# Patient Record
Sex: MALE | Race: WHITE | Employment: OTHER | URBAN - METROPOLITAN AREA
[De-identification: names, ages, dates, MRNs, and addresses within clinical notes are randomized per-mention and may not be internally consistent; named-entity substitution may affect disease eponyms.]

---

## 2017-06-27 ENCOUNTER — OFFICE VISIT (OUTPATIENT)
Dept: FAMILY MEDICINE CLINIC | Age: 57
End: 2017-06-27

## 2017-06-27 VITALS
BODY MASS INDEX: 29.13 KG/M2 | SYSTOLIC BLOOD PRESSURE: 118 MMHG | HEART RATE: 71 BPM | DIASTOLIC BLOOD PRESSURE: 83 MMHG | TEMPERATURE: 96.8 F | WEIGHT: 227 LBS | OXYGEN SATURATION: 98 % | HEIGHT: 74 IN | RESPIRATION RATE: 18 BRPM

## 2017-06-27 DIAGNOSIS — F43.0 ACUTE STRESS DISORDER: Primary | ICD-10-CM

## 2017-06-27 RX ORDER — SERTRALINE HYDROCHLORIDE 25 MG/1
25 TABLET, FILM COATED ORAL DAILY
Qty: 90 TAB | Refills: 0 | Status: SHIPPED | OUTPATIENT
Start: 2017-06-27 | End: 2017-09-28 | Stop reason: SDUPTHER

## 2017-06-27 NOTE — LETTER
NOTIFICATION RETURN TO WORK  
 
6/27/2017 4:19 PM 
 
Mr. [facility] [address] [facility] [place] [id number] [place] [id number] To Whom It May Concern: 
 
Debbie Mendez is currently under the care of [address] [place] [address]. He will return to work on: 07/05/2017 If there are questions or concerns please have the patient contact our office. Sincerely, Mitesh Smalls MD

## 2017-06-27 NOTE — PATIENT INSTRUCTIONS
Work-Life Balance: Care Instructions  Your Care Instructions  Do you ever feel like there is not enough time to do all of the things you have to do, and no time at all for the things you enjoy? If so, you are not alone. On average, people in the [place] have worked more and more hours each year since 1970. But in recent years, fewer people say they want to take on more at work, even if they would get promoted or get paid more money. More and more workers say they want time to spend with their families and to do things that are important to them. Do you ever feel:  · That you always have more and more work to do at your job? · That too many people depend on you every day? · That you never have enough time for your family or friends? · That you never have time for hobbies or things you enjoy? · That each second of your day is scheduled? If you answered \"yes\" to any of these questions, take steps at work and at home to get your life into balance. Follow-up care is a key part of your treatment and safety. Be sure to make and go to all appointments, and call your doctor if you are having problems. How can you care for yourself at home? Manage your time  · Focus on the important things. Taking on too much can wear you out. Look at how you spend your time, and redirect your focus. Learn to say \"no\" and let go of things that do not matter. · Set one small goal at a time. Use a day planner. Break large projects into smaller ones. · Ask for help. Let your children, your spouse, your coworkers, and other people in your life help you get things done. · Leave your job at the office. If you give up free time to get more work done, you may pay for it with stress. If your job offers a flexible work schedule, use it to fit your own work style. For instance, come in earlier to have a longer lunch break, or make time for a yoga class or workout during your workday. · Unplug.  Do not let technology (such as your cell phone or the Internet) erase the line between your time and your employer's time. Lower job stress  Job stress causes trouble at work and at home. At work, you may worry about things you have not had time to do at home. At home, you may worry about your job. This cycle upsets your work-life balance. Lowering your job stress can get your life back in balance. Job stress can be caused by:  · Pressure and deadlines. · Heavy workloads or long hours. · Not being allowed to make decisions. · Health and safety hazards. · Feeling you may lose your job. · Unclear or changing job duties. · Too much responsibility. · Work that is very tiring or boring. Do any of these things bother you? Consider talking with your boss to change things. There are some things that you may not be able to control. But even a few small changes might help lower your stress. Take advantage of programs at work  Businesses make money and are better off in other ways if their employees are healthy and happy. For this reason, many companies have programs to help balance work life and home life. These programs may include:  · Flexible schedules and hours. · Time off for family reasons, education, or community service. · Being able to work from home. · Employee assistance programs to provide counseling. · Child-care programs. Check to see if your company has any of these or other programs that could help you. If not, consider talking to your boss about why work-life balance programs make good business sense. Even if your company does not start an official program, you may be able to get flexible hours, time off, or the ability to do some work from home. Know when to quit  If you are truly unhappy because of a stressful job, and if the suggestions here have not worked, it may be time to think about changing jobs or changing careers. But before you quit, take time to research your options. Where can you learn more?   Go to http://silvina-parth.info/. Enter E490 in the search box to learn more about \"Work-Life Balance: Care Instructions. \"  Current as of: July 26, 2016  Content Version: 11.3  © 9540-2608 [employer]. Care instructions adapted under license by [employer] (which disclaims liability or warranty for this information). If you have questions about a medical condition or this instruction, always ask your healthcare professional. [address] any warranty or liability for your use of this information. Learning About Stress  What is stress? Stress is what you feel when you have to handle more than you are used to. Stress is a fact of life for most people, and it affects everyone differently. What causes stress for you may not be stressful for someone else. A lot of things can cause stress. You may feel stress when you go on a job interview, take a test, or run a race. This kind of short-term stress is normal and even useful. It can help you if you need to work hard or react quickly. For example, stress can help you finish an important job on time. Stress also can last a long time. Long-term stress is caused by stressful situations or events. Examples of long-term stress include long-term health problems, ongoing problems at work, or conflicts in your family. Long-term stress can harm your health. How does stress affect your health? When you are stressed, your body responds as though you are in danger. It makes hormones that speed up your heart, make you breathe faster, and give you a burst of energy. This is called the fight-or-flight stress response. If the stress is over quickly, your body goes back to normal and no harm is done. But if stress happens too often or lasts too long, it can have bad effects. Long-term stress can make you more likely to get sick, and it can make symptoms of some diseases worse.  If you tense up when you are stressed, you may develop neck, shoulder, or low back pain. Stress is linked to high blood pressure and heart disease. Stress also harms your emotional health. It can make you woodard, tense, or depressed. Your relationships may suffer, and you may not do well at work or school. What can you do to manage stress? How to relax your mind  · Write. It may help to write about things that are bothering you. This helps you find out how much stress you feel and what is causing it. When you know this, you can find better ways to cope. · Let your feelings out. Talk, laugh, cry, and express anger when you need to. Talking with friends, family, a counselor, or a member of the clergy about your feelings is a healthy way to relieve stress. · Do something you enjoy. For example, listen to music or go to a movie. Practice your hobby or do volunteer work. · Meditate. This can help you relax, because you are not worrying about what happened before or what may happen in the future. · Do guided imagery. Imagine yourself in any setting that helps you feel calm. You can use audiotapes, books, or a teacher to guide you. How to relax your body  · Do something active. Exercise or activity can help reduce stress. Walking is a great way to get started. Even everyday activities such as housecleaning or yard work can help. · Do breathing exercises. For example:  ¨ From a standing position, bend forward from the waist with your knees slightly bent. Let your arms dangle close to the floor. ¨ Breathe in slowly and deeply as you return to a standing position. Roll up slowly and lift your head last.  ¨ Hold your breath for just a few seconds in the standing position. ¨ Breathe out slowly and bend forward from the waist.  · Try yoga or karlene chi. These techniques combine exercise and meditation. You may need some training at first to learn them. What can you do to prevent stress? · Manage your time.  This helps you find time to do the things you want and need to do. · Get enough sleep. Your body recovers from the stresses of the day while you are sleeping. · Get support. Your family, friends, and community can make a difference in how you experience stress. Where can you learn more? Go to http://silvina-parth.info/. Enter X506 in the search box to learn more about \"Learning About Stress. \"  Current as of: July 26, 2016  Content Version: 11.3  © 4291-3318 [employer]. Care instructions adapted under license by [employer] (which disclaims liability or warranty for this information). If you have questions about a medical condition or this instruction, always ask your healthcare professional. [address] any warranty or liability for your use of this information.

## 2017-06-27 NOTE — MR AVS SNAPSHOT
Visit Information Date & Time Provider Department Dept. Phone Encounter #  
 6/27/2017  4:00 PM Trina Bradley MD [place] [id number] [phone] Upcoming Health Maintenance Date Due Hepatitis C Screening 1960 DTaP/Tdap/Td series (1 - Tdap) 12/20/1983 FOBT Q 1 YEAR AGE 50-75 12/20/2012 INFLUENZA AGE 9 TO ADULT 8/1/2017 Allergies as of 6/27/2017  Review Complete On: 6/27/2017 By: Eun Warner MD  
 No Known Allergies Current Immunizations  Never Reviewed No immunizations on file. Not reviewed this visit You Were Diagnosed With   
  
 Codes Comments Acute stress disorder    -  Primary ICD-10-CM: F43.0 ICD-9-CM: 308. 3 Vitals BP Pulse Temp Resp Height(growth percentile) Weight(growth percentile) 118/83 (BP 1 Location: Left arm, BP Patient Position: Sitting) 71 96.8 °F (36 °C) (Oral) 18 6' 2\" (1.88 m) 227 lb (103 kg) SpO2 BMI Smoking Status 98% 29.15 kg/m2 Never Smoker BMI and BSA Data Body Mass Index Body Surface Area  
 29.15 kg/m 2 2.32 m 2 Preferred Pharmacy Pharmacy Name Phone [id number] [address] Your Updated Medication List  
  
   
This list is accurate as of: 6/27/17  4:24 PM.  Always use your most recent med list.  
  
  
  
  
 sertraline 25 mg tablet Commonly known as:  ZOLOFT Take 1 Tab by mouth daily. Prescriptions Sent to Pharmacy Refills  
 sertraline (ZOLOFT) 25 mg tablet 0 Sig: Take 1 Tab by mouth daily. Class: Normal  
 Pharmacy[id number], [address] [place] [address] Ph #: [phone] Route: Oral  
  
Patient Instructions Work-Life Balance: Care Instructions Your Care Instructions Do you ever feel like there is not enough time to do all of the things you have to do, and no time at all for the things you enjoy?  If so, you are not alone. On average, people in the [place] have worked more and more hours each year since 1970. But in recent years, fewer people say they want to take on more at work, even if they would get promoted or get paid more money. More and more workers say they want time to spend with their families and to do things that are important to them. Do you ever feel: · That you always have more and more work to do at your job? · That too many people depend on you every day? · That you never have enough time for your family or friends? · That you never have time for hobbies or things you enjoy? · That each second of your day is scheduled? If you answered \"yes\" to any of these questions, take steps at work and at home to get your life into balance. Follow-up care is a key part of your treatment and safety. Be sure to make and go to all appointments, and call your doctor if you are having problems. How can you care for yourself at home? Manage your time · Focus on the important things. Taking on too much can wear you out. Look at how you spend your time, and redirect your focus. Learn to say \"no\" and let go of things that do not matter. · Set one small goal at a time. Use a day planner. Break large projects into smaller ones. · Ask for help. Let your children, your spouse, your coworkers, and other people in your life help you get things done. · Leave your job at the office. If you give up free time to get more work done, you may pay for it with stress. If your job offers a flexible work schedule, use it to fit your own work style. For instance, come in earlier to have a longer lunch break, or make time for a yoga class or workout during your workday. · Unplug. Do not let technology (such as your cell phone or the Internet) erase the line between your time and your employer's time. Lower job stress Job stress causes trouble at work and at home.  At work, you may worry about things you have not had time to do at home. At home, you may worry about your job. This cycle upsets your work-life balance. Lowering your job stress can get your life back in balance. Job stress can be caused by: · Pressure and deadlines. · Heavy workloads or long hours. · Not being allowed to make decisions. · Health and safety hazards. · Feeling you may lose your job. · Unclear or changing job duties. · Too much responsibility. · Work that is very tiring or boring. Do any of these things bother you? Consider talking with your boss to change things. There are some things that you may not be able to control. But even a few small changes might help lower your stress. Take advantage of programs at work Businesses make money and are better off in other ways if their employees are healthy and happy. For this reason, many companies have programs to help balance work life and home life. These programs may include: · Flexible schedules and hours. · Time off for family reasons, education, or community service. · Being able to work from home. · Employee assistance programs to provide counseling. · Child-care programs. Check to see if your company has any of these or other programs that could help you. If not, consider talking to your boss about why work-life balance programs make good business sense. Even if your company does not start an official program, you may be able to get flexible hours, time off, or the ability to do some work from home. Know when to quit If you are truly unhappy because of a stressful job, and if the suggestions here have not worked, it may be time to think about changing jobs or changing careers. But before you quit, take time to research your options. Where can you learn more? Go to http://silvina-parth.info/. Enter B098 in the search box to learn more about \"Work-Life Balance: Care Instructions. \" Current as of: July 26, 2016 Content Version: 11.3 © 1428-0033 [employer]. Care instructions adapted under license by [employer] (which disclaims liability or warranty for this information). If you have questions about a medical condition or this instruction, always ask your healthcare professional. [address] any warranty or liability for your use of this information. Learning About Stress What is stress? Stress is what you feel when you have to handle more than you are used to. Stress is a fact of life for most people, and it affects everyone differently. What causes stress for you may not be stressful for someone else. A lot of things can cause stress. You may feel stress when you go on a job interview, take a test, or run a race. This kind of short-term stress is normal and even useful. It can help you if you need to work hard or react quickly. For example, stress can help you finish an important job on time. Stress also can last a long time. Long-term stress is caused by stressful situations or events. Examples of long-term stress include long-term health problems, ongoing problems at work, or conflicts in your family. Long-term stress can harm your health. How does stress affect your health? When you are stressed, your body responds as though you are in danger. It makes hormones that speed up your heart, make you breathe faster, and give you a burst of energy. This is called the fight-or-flight stress response. If the stress is over quickly, your body goes back to normal and no harm is done. But if stress happens too often or lasts too long, it can have bad effects. Long-term stress can make you more likely to get sick, and it can make symptoms of some diseases worse. If you tense up when you are stressed, you may develop neck, shoulder, or low back pain. Stress is linked to high blood pressure and heart disease. Stress also harms your emotional health. It can make you woodard, tense, or depressed. Your relationships may suffer, and you may not do well at work or school. What can you do to manage stress? How to relax your mind · Write. It may help to write about things that are bothering you. This helps you find out how much stress you feel and what is causing it. When you know this, you can find better ways to cope. · Let your feelings out. Talk, laugh, cry, and express anger when you need to. Talking with friends, family, a counselor, or a member of the clergy about your feelings is a healthy way to relieve stress. · Do something you enjoy. For example, listen to music or go to a movie. Practice your hobby or do volunteer work. · Meditate. This can help you relax, because you are not worrying about what happened before or what may happen in the future. · Do guided imagery. Imagine yourself in any setting that helps you feel calm. You can use audiotapes, books, or a teacher to guide you. How to relax your body · Do something active. Exercise or activity can help reduce stress. Walking is a great way to get started. Even everyday activities such as housecleaning or yard work can help. · Do breathing exercises. For example: ¨ From a standing position, bend forward from the waist with your knees slightly bent. Let your arms dangle close to the floor. ¨ Breathe in slowly and deeply as you return to a standing position. Roll up slowly and lift your head last. 
¨ Hold your breath for just a few seconds in the standing position. ¨ Breathe out slowly and bend forward from the waist. 
· Try yoga or karlene chi. These techniques combine exercise and meditation. You may need some training at first to learn them. What can you do to prevent stress? · Manage your time. This helps you find time to do the things you want and need to do. · Get enough sleep. Your body recovers from the stresses of the day while you are sleeping. · Get support. Your family, friends, and community can make a difference in how you experience stress. Where can you learn more? Go to http://silvina-parth.info/. Enter W518 in the search box to learn more about \"Learning About Stress. \" Current as of: July 26, 2016 Content Version: 11.3 © 3792-1896 [employer]. Care instructions adapted under license by [employer] (which disclaims liability or warranty for this information). If you have questions about a medical condition or this instruction, always ask your healthcare professional. [address] any warranty or liability for your use of this information. Introducing [facility]! Dear Morro Holland: Thank you for requesting a [employer] account. Our records indicate that you already have an active [employer] account. You can access your account anytime at [email or web address] Did you know that you can access your hospital and ER discharge instructions at any time in [employer]OCS? You can also review all of your test results from your hospital stay or ER visit. Additional Information If you have questions, please visit the Frequently Asked Questions section of the IOCS website at [email or web address] Remember, [employer]S is NOT to be used for urgent needs. For medical emergencies, dial 911. Now available from your iPhone and Android! Please provide this summary of care documentation to your next provider. Your primary care clinician is listed as Trina Gee. If you have any questions after today's visit, please call [phone].

## 2017-06-27 NOTE — PROGRESS NOTES
Chief Complaint   Patient presents with    Stress    Anxiety       1. Have you been to the ER, urgent care clinic since your last visit? Hospitalized since your last visit? No    2. Have you seen or consulted any other health care providers outside of the [address] [place] [address] since your last visit? Include any pap smears or colon screening. No     HPI  Lexie Smith comes in for follow-up care. Patient has been having stress and anxiety. This is work-related. There is a lot going on at his workplace that is causing him to feel agitated and stressed out. This is affecting his performance at work. He has in the past been on Zoloft but had stopped taking this medication. After discussion he would like to try getting back on the medication. Also given the acute distress we will have him take some time off work to recuperate. Will have him go back to work next week. He is on Zoloft and I will follow-up in a month to see how he is doing. Past Medical History  Past Medical History:   Diagnosis Date    Arthritis     Chronic radicular cervical pain     DDD (degenerative disc disease), cervical     Fracture     Ganglion cyst of wrist     Hearing loss     Hemorrhoids     History of kidney stones     History of smokeless tobacco use     Hx of neck injury     Sleep apnea     TMJ (dislocation of temporomandibular joint)        Surgical History  Past Surgical History:   Procedure Laterality Date    HX HERNIA REPAIR  2013    HX SEPTOPLASTY  1990        Medications  Current Outpatient Prescriptions   Medication Sig Dispense Refill    sertraline (ZOLOFT) 50 mg tablet Take 1 Tab by mouth daily.  Indications: adjustment disorder 90 Tab 0       Allergies  No Known Allergies    Family History  Family History   Problem Relation Age of Onset    Cancer Other      paternal aunt       Social History  Social History     Social History    Marital status:      Spouse name: N/A    Number of children: N/A    Years of education: N/A     Occupational History    manager      Social History Main Topics    Smoking status: Never Smoker    Smokeless tobacco: Not on file    Alcohol use No      Comment: 1989.      Drug use: No    Sexual activity: Yes     Partners: Female     Other Topics Concern    Caffeine Concern No    Occupational Exposure No    Hobby Hazards No    Sleep Concern No    Stress Concern No    Weight Concern No    Special Diet Yes    Exercise Yes    Seat Belt Yes    Self-Exams No     Social History Narrative       Review of Systems  Review of Systems - History obtained from chart review and the patient  General ROS: positive for  - fatigue and malaise  Psychological ROS: positive for - anxiety, behavioral disorder, depression, mood swings and sleep disturbances  Ophthalmic ROS: negative  ENT ROS: negative  Allergy and Immunology ROS: negative  Cardiovascular ROS: no chest pain or dyspnea on exertion  Gastrointestinal ROS: no abdominal pain, change in bowel habits, or black or bloody stools  Genito-Urinary ROS: negative  Musculoskeletal ROS: negative  Neurological ROS: negative    Vital Signs  Visit Vitals    /83 (BP 1 Location: Left arm, BP Patient Position: Sitting)    Pulse 71    Temp 96.8 °F (36 °C) (Oral)    Resp 18    Ht 6' 2\" (1.88 m)    Wt 227 lb (103 kg)    SpO2 98%    BMI 29.15 kg/m2         Physical Exam  Physical Examination: General appearance - alert, well appearing, and in no distress, oriented to person, place, and time and acyanotic, in no respiratory distress  Mental status - alert, oriented to person, place, and time, normal mood, behavior, speech, dress, motor activity, and thought processes  Mouth - mucous membranes moist, pharynx normal without lesions  Chest - no tachypnea, retractions or cyanosis  Heart - S1 and S2 normal  Neurological - motor and sensory grossly normal bilaterally  Musculoskeletal - full range of motion without pain    Diagnostics  No orders of the defined types were placed in this encounter. Results  Results for orders placed or performed in visit on 10/02/15   AMB EXT LDL-C   Result Value Ref Range    LDL-C, External 87    AMB EXT CREATININE   Result Value Ref Range    Creatinine, External 0.8    AMB EXT PSA   Result Value Ref Range    PSA, External <=3.100      ASSESSMENT and PLAN    ICD-10-CM ICD-9-CM    1. Acute stress disorder F43.0 308.3 sertraline (ZOLOFT) 25 mg tablet     reviewed diet, exercise and weight control  reviewed medications and side effects in detail    I have discussed the diagnosis with the patient and the intended plan of care as seen in the above orders. The patient has received an after-visit summary and questions were answered concerning future plans. I have discussed medication, side effects, and warnings with the patient in detail. The patient verbalized understanding and is in agreement with the plan of care. The patient will contact the office with any additional concerns.     Maxx Arriaga MD

## 2017-07-25 ENCOUNTER — OFFICE VISIT (OUTPATIENT)
Dept: FAMILY MEDICINE CLINIC | Age: 57
End: 2017-07-25

## 2017-07-25 VITALS
HEIGHT: 74 IN | BODY MASS INDEX: 28.83 KG/M2 | WEIGHT: 224.6 LBS | TEMPERATURE: 96.6 F | SYSTOLIC BLOOD PRESSURE: 114 MMHG | RESPIRATION RATE: 18 BRPM | OXYGEN SATURATION: 98 % | DIASTOLIC BLOOD PRESSURE: 75 MMHG | HEART RATE: 67 BPM

## 2017-07-25 DIAGNOSIS — Z12.11 SCREEN FOR COLON CANCER: ICD-10-CM

## 2017-07-25 DIAGNOSIS — G89.29 CHRONIC PAIN OF RIGHT KNEE: Primary | ICD-10-CM

## 2017-07-25 DIAGNOSIS — M25.561 CHRONIC PAIN OF RIGHT KNEE: Primary | ICD-10-CM

## 2017-07-25 NOTE — MR AVS SNAPSHOT
Visit Information Date & Time Provider Department Dept. Phone Encounter #  
 7/25/2017  3:00 PM Trina Díaz MD [facility] [phone] [id number] Follow-up Instructions Return if symptoms worsen or fail to improve. Upcoming Health Maintenance Date Due Hepatitis C Screening 1960 DTaP/Tdap/Td series (1 - Tdap) 12/20/1983 FOBT Q 1 YEAR AGE 50-75 12/20/2012 INFLUENZA AGE 9 TO ADULT 8/1/2017 Allergies as of 7/25/2017  Review Complete On: 7/25/2017 By: Rufus Mantilla MD  
 No Known Allergies Current Immunizations  Never Reviewed No immunizations on file. Not reviewed this visit You Were Diagnosed With   
  
 Codes Comments Chronic pain of right knee    -  Primary ICD-10-CM: M25.561, E71.35 ICD-9-CM: 719.46, 338.29 Encounter for hepatitis C screening test for low risk patient     ICD-10-CM: Z11.59 
ICD-9-CM: V73.89 Vitals BP Pulse Temp Resp Height(growth percentile) Weight(growth percentile) 114/75 (BP 1 Location: Left arm, BP Patient Position: Sitting) 67 96.6 °F (35.9 °C) (Oral) 18 6' 2\" (1.88 m) 224 lb 9.6 oz (101.9 kg) SpO2 BMI Smoking Status 98% 28.84 kg/m2 Never Smoker BMI and BSA Data Body Mass Index Body Surface Area  
 28.84 kg/m 2 2.31 m 2 Preferred Pharmacy Pharmacy Name Phone [id number] [address] [address] Your Updated Medication List  
  
   
This list is accurate as of: 7/25/17  3:08 PM.  Always use your most recent med list.  
  
  
  
  
 sertraline 25 mg tablet Commonly known as:  ZOLOFT Take 1 Tab by mouth daily. We Performed the Following REFERRAL TO ORTHOPEDICS [LTH203 Custom] Comments:  
 Please evaluate patient for chronic right knee pain. Follow-up Instructions Return if symptoms worsen or fail to improve. To-Do List   
 07/25/2017 Lab:  OCCULT BLOOD, IMMUNOASSAY (FIT)   
  
 07/25/2017 Imaging:  XR KNEE RT MIN 4 V Referral Information Referral ID Referred By Referred To  
  
 [id number] Shabnam Uriostegui N Not Available Visits Status Start Date End Date 1 New Request 7/25/17 7/25/18 If your referral has a status of pending review or denied, additional information will be sent to support the outcome of this decision. Patient Instructions Knee Pain or Injury: Care Instructions Your Care Instructions Injuries are a common cause of knee problems. Sudden (acute) injuries may be caused by a direct blow to the knee. They can also be caused by abnormal twisting, bending, or falling on the knee. Pain, bruising, or swelling may be severe, and may start within minutes of the injury. Overuse is another cause of knee pain. Other causes are climbing stairs, kneeling, and other activities that use the knee. Everyday wear and tear, especially as you get older, also can cause knee pain. Rest, along with home treatment, often relieves pain and allows your knee to heal. If you have a serious knee injury, you may need tests and treatment. Follow-up care is a key part of your treatment and safety. Be sure to make and go to all appointments, and call your doctor if you are having problems. It's also a good idea to know your test results and keep a list of the medicines you take. How can you care for yourself at home? · Be safe with medicines. Read and follow all instructions on the label. ¨ If the doctor gave you a prescription medicine for pain, take it as prescribed. ¨ If you are not taking a prescription pain medicine, ask your doctor if you can take an over-the-counter medicine. · Rest and protect your knee. Take a break from any activity that may cause pain. · Put ice or a cold pack on your knee for 10 to 20 minutes at a time. Put a thin cloth between the ice and your skin. · Prop up a sore knee on a pillow when you ice it or anytime you sit or lie down for the next 3 days. Try to keep it above the level of your heart. This will help reduce swelling. · If your knee is not swollen, you can put moist heat, a heating pad, or a warm cloth on your knee. · If your doctor recommends an elastic bandage, sleeve, or other type of support for your knee, wear it as directed. · Follow your doctor's instructions about how much weight you can put on your leg. Use a cane, crutches, or a walker as instructed. · Follow your doctor's instructions about activity during your healing process. If you can do mild exercise, slowly increase your activity. · Reach and stay at a healthy weight. Extra weight can strain the joints, especially the knees and hips, and make the pain worse. Losing even a few pounds may help. When should you call for help? Call 911 anytime you think you may need emergency care. For example, call if: 
· You have symptoms of a blood clot in your lung (called a pulmonary embolism). These may include: 
¨ Sudden chest pain. ¨ Trouble breathing. ¨ Coughing up blood. Call your doctor now or seek immediate medical care if: 
· You have severe or increasing pain. · Your leg or foot turns cold or changes color. · You cannot stand or put weight on your knee. · Your knee looks twisted or bent out of shape. · You cannot move your knee. · You have signs of infection, such as: 
¨ Increased pain, swelling, warmth, or redness. ¨ Red streaks leading from the knee. ¨ Pus draining from a place on your knee. ¨ A fever. · You have signs of a blood clot in your leg (called a deep vein thrombosis), such as: 
¨ Pain in your calf, back of the knee, thigh, or groin. ¨ Redness and swelling in your leg or groin. Watch closely for changes in your health, and be sure to contact your doctor if: 
· You have tingling, weakness, or numbness in your knee. · You have any new symptoms, such as swelling. · You have bruises from a knee injury that last longer than 2 weeks. · You do not get better as expected. Where can you learn more? Go to http://silvina-parth.info/. Enter K195 in the search box to learn more about \"Knee Pain or Injury: Care Instructions. \" Current as of: March 20, 2017 Content Version: 11.3 © 6194-2119 [employer]. Care instructions adapted under license by [employer] (which disclaims liability or warranty for this information). If you have questions about a medical condition or this instruction, always ask your healthcare professional. [address] any warranty or liability for your use of this information. Introducing [facility]! Dear Kristen Jimenez: Thank you for requesting a Streetcar account. Our records indicate that you already have an active Streetcar account. You can access your account anytime at [email or web address] Did you know that you can access your hospital and ER discharge instructions at any time in Streetcar? You can also review all of your test results from your hospital stay or ER visit. Additional Information If you have questions, please visit the Frequently Asked Questions section of the Streetcar website at [email or web address] Remember, Streetcar is NOT to be used for urgent needs. For medical emergencies, dial 911. Now available from your iPhone and Android! Please provide this summary of care documentation to your next provider. Your primary care clinician is listed as Trina Gee. If you have any questions after today's visit, please call [phone].

## 2017-07-25 NOTE — PATIENT INSTRUCTIONS
Knee Pain or Injury: Care Instructions  Your Care Instructions    Injuries are a common cause of knee problems. Sudden (acute) injuries may be caused by a direct blow to the knee. They can also be caused by abnormal twisting, bending, or falling on the knee. Pain, bruising, or swelling may be severe, and may start within minutes of the injury. Overuse is another cause of knee pain. Other causes are climbing stairs, kneeling, and other activities that use the knee. Everyday wear and tear, especially as you get older, also can cause knee pain. Rest, along with home treatment, often relieves pain and allows your knee to heal. If you have a serious knee injury, you may need tests and treatment. Follow-up care is a key part of your treatment and safety. Be sure to make and go to all appointments, and call your doctor if you are having problems. It's also a good idea to know your test results and keep a list of the medicines you take. How can you care for yourself at home? · Be safe with medicines. Read and follow all instructions on the label. ¨ If the doctor gave you a prescription medicine for pain, take it as prescribed. ¨ If you are not taking a prescription pain medicine, ask your doctor if you can take an over-the-counter medicine. · Rest and protect your knee. Take a break from any activity that may cause pain. · Put ice or a cold pack on your knee for 10 to 20 minutes at a time. Put a thin cloth between the ice and your skin. · Prop up a sore knee on a pillow when you ice it or anytime you sit or lie down for the next 3 days. Try to keep it above the level of your heart. This will help reduce swelling. · If your knee is not swollen, you can put moist heat, a heating pad, or a warm cloth on your knee. · If your doctor recommends an elastic bandage, sleeve, or other type of support for your knee, wear it as directed.   · Follow your doctor's instructions about how much weight you can put on your leg. Use a cane, crutches, or a walker as instructed. · Follow your doctor's instructions about activity during your healing process. If you can do mild exercise, slowly increase your activity. · Reach and stay at a healthy weight. Extra weight can strain the joints, especially the knees and hips, and make the pain worse. Losing even a few pounds may help. When should you call for help? Call 911 anytime you think you may need emergency care. For example, call if:  · You have symptoms of a blood clot in your lung (called a pulmonary embolism). These may include:  ¨ Sudden chest pain. ¨ Trouble breathing. ¨ Coughing up blood. Call your doctor now or seek immediate medical care if:  · You have severe or increasing pain. · Your leg or foot turns cold or changes color. · You cannot stand or put weight on your knee. · Your knee looks twisted or bent out of shape. · You cannot move your knee. · You have signs of infection, such as:  ¨ Increased pain, swelling, warmth, or redness. ¨ Red streaks leading from the knee. ¨ Pus draining from a place on your knee. ¨ A fever. · You have signs of a blood clot in your leg (called a deep vein thrombosis), such as:  ¨ Pain in your calf, back of the knee, thigh, or groin. ¨ Redness and swelling in your leg or groin. Watch closely for changes in your health, and be sure to contact your doctor if:  · You have tingling, weakness, or numbness in your knee. · You have any new symptoms, such as swelling. · You have bruises from a knee injury that last longer than 2 weeks. · You do not get better as expected. Where can you learn more? Go to http://silvina-parth.info/. Enter K195 in the search box to learn more about \"Knee Pain or Injury: Care Instructions. \"  Current as of: March 20, 2017  Content Version: 11.3  © 3440-4412 [employer].  Care instructions adapted under license by [employer] (which disclaims liability or warranty for this information). If you have questions about a medical condition or this instruction, always ask your healthcare professional. [place] [address] any warranty or liability for your use of this information.

## 2017-07-25 NOTE — PROGRESS NOTES
Chief Complaint   Patient presents with    Knee Pain     right knee pain getting worse      1. Have you been to the ER, urgent care clinic since your last visit? Hospitalized since your last visit? No    2. Have you seen or consulted any other health care providers outside of the [address] [place] [address] since your last visit? Include any pap smears or colon screening. No     HPI  Leandro Gordon comes in for follow-up care. 1 patient has right knee pain.) Knee pain: He has a history of fracture right knee at the tibial plateau. This was years ago. Healed with deformity. Since then has been having pain on and off. Lately pain is worse especially when he is walking or standing for long hours. He works at [employer]. He is walking walls going up and down stairs and climbing ladders. This has aggravated the pain in his knee. He has a radicular type pain radiating at the back of his leg. No redness or erythema of the leg or calf but he does have swelling of his knee. Has been taking Aleve with transient relief. We discussed management options. For now we will have him get work restrictions. This will have him avoid eating weight on the knee and standing for long hours while at work. I will have him get an x-ray done of the knee and refer him to the orthopedic clinic. 2) HM: Patient would like to do the FIT test.  Declines to get his hepatitis C screening done.       Past Medical History  Past Medical History:   Diagnosis Date    Arthritis     Chronic radicular cervical pain     DDD (degenerative disc disease), cervical     Fracture     Ganglion cyst of wrist     Hearing loss     Hemorrhoids     History of kidney stones     History of smokeless tobacco use     Hx of neck injury     Sleep apnea     TMJ (dislocation of temporomandibular joint)        Surgical History  Past Surgical History:   Procedure Laterality Date    HX HERNIA REPAIR  2013    HX SEPTOPLASTY  1990 Medications  Current Outpatient Prescriptions   Medication Sig Dispense Refill    sertraline (ZOLOFT) 25 mg tablet Take 1 Tab by mouth daily. 90 Tab 0       Allergies  No Known Allergies    Family History  Family History   Problem Relation Age of Onset    Cancer Other      paternal aunt       Social History  Social History     Social History    Marital status:      Spouse name: N/A    Number of children: N/A    Years of education: N/A     Occupational History    manager      Social History Main Topics    Smoking status: Never Smoker    Smokeless tobacco: Not on file    Alcohol use No      Comment: 1989.      Drug use: No    Sexual activity: Yes     Partners: Female     Other Topics Concern    Caffeine Concern No    Occupational Exposure No    Hobby Hazards No    Sleep Concern No    Stress Concern No    Weight Concern No    Special Diet Yes    Exercise Yes    Seat Belt Yes    Self-Exams No     Social History Narrative       Review of Systems  Review of Systems - History obtained from chart review and the patient  General ROS: negative  Psychological ROS: negative  Respiratory ROS: no cough, shortness of breath, or wheezing  Cardiovascular ROS: no chest pain or dyspnea on exertion  Gastrointestinal ROS: negative  Musculoskeletal ROS: positive for - joint pain and pain in knee - right  Neurological ROS: negative    Vital Signs  Visit Vitals    /75 (BP 1 Location: Left arm, BP Patient Position: Sitting)    Pulse 67    Temp 96.6 °F (35.9 °C) (Oral)    Resp 18    Ht 6' 2\" (1.88 m)    Wt 224 lb 9.6 oz (101.9 kg)    SpO2 98%    BMI 28.84 kg/m2         Physical Exam  Physical Examination: General appearance - alert, well appearing, and in no distress, oriented to person, place, and time and acyanotic, in no respiratory distress  Mental status - alert, oriented to person, place, and time, normal mood, behavior, speech, dress, motor activity, and thought processes  Neck - supple, no significant adenopathy  Chest - no tachypnea, retractions or cyanosis  Heart - S1 and S2 normal  Neurological - alert, oriented, normal speech, no focal findings or movement disorder noted  Musculoskeletal -right knee with slight swelling as compared to the left. There is some discomfort and tenderness to palpation on the lateral knee margins. Limitation of flexion and extension against resistance due to pain and discomfort. No crepitus felt no catching sensation. Extremities - no pedal edema noted    Diagnostics  No orders of the defined types were placed in this encounter. Results  Results for orders placed or performed in visit on 10/02/15   AMB EXT LDL-C   Result Value Ref Range    LDL-C, External 87    AMB EXT CREATININE   Result Value Ref Range    Creatinine, External 0.8    AMB EXT PSA   Result Value Ref Range    PSA, External <=3.100      ASSESSMENT and PLAN    ICD-10-CM ICD-9-CM    1. Chronic pain of right knee M25.561 719.46 XR KNEE RT MIN 4 V    G89.29 338.29 REFERRAL TO ORTHOPEDICS   2. Screen for colon cancer Z12.11 V76.51 OCCULT BLOOD, IMMUNOASSAY (FIT)     reviewed diet, exercise and weight control  reviewed medications and side effects in detail  radiology results and schedule of future radiology studies reviewed with patient      I have discussed the diagnosis with the patient and the intended plan of care as seen in the above orders. The patient has received an after-visit summary and questions were answered concerning future plans. I have discussed medication, side effects, and warnings with the patient in detail. The patient verbalized understanding and is in agreement with the plan of care. The patient will contact the office with any additional concerns.     Dimitri Sarmiento MD

## 2017-07-25 NOTE — LETTER
NOTIFICATION RETURN TO WORK  
 
 
7/25/2017 3:00 PM 
 
[facility]. Nuha Hill [address] [facility] [address] [address] To Whom It May Concern: 
 
Nuha Hill is currently under the care of [address] He will return to work on: 07/26/2017 He should have work restrictions. Should not climb ladders and stairs. He should avoid putting weight on his right knee while at work and should stand only 33% of the time. This restrictions apply until he sees the orthopedic specialist. 
 
If there are questions or concerns please have the patient contact our office. Sincerely, Edelmira Merlos MD

## 2017-09-28 DIAGNOSIS — F43.0 ACUTE STRESS DISORDER: ICD-10-CM

## 2017-09-28 RX ORDER — SERTRALINE HYDROCHLORIDE 25 MG/1
TABLET, FILM COATED ORAL
Qty: 90 TAB | Refills: 0 | Status: SHIPPED | OUTPATIENT
Start: 2017-09-28 | End: 2017-10-26 | Stop reason: SDUPTHER

## 2017-10-26 ENCOUNTER — OFFICE VISIT (OUTPATIENT)
Dept: FAMILY MEDICINE CLINIC | Age: 57
End: 2017-10-26

## 2017-10-26 VITALS
TEMPERATURE: 96.5 F | RESPIRATION RATE: 16 BRPM | HEART RATE: 94 BPM | DIASTOLIC BLOOD PRESSURE: 77 MMHG | OXYGEN SATURATION: 98 % | HEIGHT: 74 IN | WEIGHT: 227 LBS | BODY MASS INDEX: 29.13 KG/M2 | SYSTOLIC BLOOD PRESSURE: 115 MMHG

## 2017-10-26 DIAGNOSIS — F39 MOOD DISORDER (HCC): ICD-10-CM

## 2017-10-26 DIAGNOSIS — Z12.11 SCREEN FOR COLON CANCER: ICD-10-CM

## 2017-10-26 DIAGNOSIS — F43.0 ACUTE STRESS DISORDER: Primary | ICD-10-CM

## 2017-10-26 RX ORDER — SERTRALINE HYDROCHLORIDE 25 MG/1
TABLET, FILM COATED ORAL
Qty: 90 TAB | Refills: 1 | Status: SHIPPED | OUTPATIENT
Start: 2017-10-26 | End: 2018-12-26

## 2017-10-26 NOTE — LETTER
10/26/2017 2:01 PM 
 
Mr. Bart Campo 24 [facility] [facility] [address] [address] To Whom It May Concern: 
 
Bart Campo is currently under the care of [address] [place] [address]. He has been followed up for acute stress disorder and behavioral health reasons. Patient does state that one of the triggers of acute stress is working with a particular individual at the workplace. He does have stress symptoms and occasionally does need to take extra medication when he is to work with the said individual.  He is on regular medication to help control his stress. It would be helpful if patient is to avoid  working with this individual at the workplace if possible. Your  assistance will be highly appreciated and will go a long way in the helping Mr. Cate parrish. 
 
If there are questions or concerns please have the patient contact our office. Sincerely, Sergio Yanez MD

## 2017-10-26 NOTE — PATIENT INSTRUCTIONS
Learning About Mood Disorders  What are mood disorders? Mood disorders are medical problems that affect how you feel. They can impact your moods, thoughts, and actions. Mood disorders include:  · Depression. This causes you to feel sad or hopeless for much of the time. · Bipolar disorder. This causes extreme mood changes from manic episodes of very high energy to extreme lows of depression. · Seasonal affective disorder (SAD). This is a type of depression that affects you during the same season each year. Most often people experience SAD during the fall and winter months when days are shorter and there is less light. What are the symptoms? Depression  You may:  · Feel sad or hopeless nearly every day. · Lose interest in or not get pleasure from most daily activities. You feel this way nearly every day. · Have low energy, changes in your appetite, or changes in how well you sleep. · Have trouble concentrating. · Think about death and suicide. Keep the numbers for these national suicide hotlines: 4-846-696-TALK (2-689.560.6714) and 7-960-QTCUVJR (7-671.777.4447). If you or someone you know talks about suicide or feeling hopeless, get help right away. Bipolar disorder  Symptoms depend on your mood swings. You may:  · Feel very happy, energetic, or on edge. · Feel like you need very little sleep. · Feel overly self-confident. · Do impulsive things, such as spending a lot of money. · Feel sad or hopeless. · Have racing thoughts or trouble thinking and making decisions. · Lose interest in things you have enjoyed in the past.  · Think about death and suicide. Keep the numbers for these national suicide hotlines: 2-738-070-TALK (7-772.300.8470) and 2-322-TQMQSMY (1-273.691.3561). If you or someone you know talks about suicide or feeling hopeless, get help right away. Seasonal affective disorder (SAD)  Symptoms come and go at about the same time each year.  For most people with SAD, symptoms come during the winter when there is less daylight. You may:  · Feel sad, grumpy, woodard, or anxious. · Lose interest in your usual activities. · Eat more and crave carbohydrates, such as bread and pasta. · Gain weight. · Sleep more and feel drowsy during the daytime. How are mood disorders treated? Mood disorders can be treated with medicines or counseling, or a combination of both. Medicines for depression and SAD may include antidepressants. Medicines for bipolar disorder may include:  · Mood stabilizers. · Antipsychotics. · Benzodiazepines. Counseling may involve cognitive-behavioral therapy. It teaches you how to change the ways you think and behave. This can help you stop thinking bad thoughts about yourself and your life. Light therapy is the main treatment for SAD. This therapy uses a special kind of lamp. You let the lamp shine on you at certain times, usually in the morning. This may help your symptoms during the months when there is less sunlight. Healthy lifestyle  Healthy lifestyle changes may help you feel better. · Get at least 30 minutes of exercise on most days of the week. Walking is a good choice. · Eat a healthy diet. Include fruits, vegetables, lean proteins, and whole grains in your diet each day. · Keep a regular sleep schedule. Try for 8 hours of sleep a night. · Find ways to manage stress, such as relaxation exercises. · Avoid alcohol and illegal drugs. Follow-up care is a key part of your treatment and safety. Be sure to make and go to all appointments, and call your doctor if you are having problems. It's also a good idea to know your test results and keep a list of the medicines you take. Where can you learn more? Go to http://silvina-parth.info/. Enter C684 in the search box to learn more about \"Learning About Mood Disorders. \"  Current as of: May 12, 2017  Content Version: 11.4  © 8646-0464 Healthwise, Incorporated.  Care instructions adapted under license by [address] (which disclaims liability or warranty for this information). If you have questions about a medical condition or this instruction, always ask your healthcare professional. [address] any warranty or liability for your use of this information.

## 2017-10-26 NOTE — LETTER
NOTIFICATION RETURN TO WORK / SCHOOL 
 
10/26/2017 2:16 PM 
 
Mr. Magda Sow [address] [facility] Dieudonne Almaguer [facility] [address] [address] To Whom It May Concern: 
 
Magda Sow is currently under the care of [facility]. He has been followed up for acute stress disorder and behavioral health reasons. Patient does state that one of the triggers of acute stress is working with, speaking of or with a particular individual at the workplace. He does have stress symptoms and occasionally does need to take extra medication when he is to work with the said individual.  He is on regular medication to help control his stress. It would be helpful if patient is to avoid  working with this individual at the workplace if possible. Your  assistance will be highly appreciated and will go a long way in the helping Mr. William Jackson shivani. 
 
If there are questions or concerns please have the patient contact our office. Sincerely, Bard Nageotte, MD

## 2017-10-26 NOTE — PROGRESS NOTES
Chief Complaint   Patient presents with    Stress     stress follow-up. patient states taking zoloft with help. 1. Have you been to the ER, urgent care clinic since your last visit? Hospitalized since your last visit? No    2. Have you seen or consulted any other health care providers outside of the [address] [place] [address] since your last visit? Include any pap smears or colon screening. No     HPI  [facility] comes in for f/u care. Acute stress disorder: Patient has acute stress. States that a lot of the stress from his workplace is due to contact with a specific colleague. He is on Zoloft and would like a refill of the medication. He also would like a letter stating that the his stress is triggered by contact with this could leak and the when he is away from the colleague it makes him relax. I will give him a letter stating that this is what he feels. I will also refill his medication. Follow-up in 3 months. HM: Patient is due for colon cancer screening. I will refer him for colonoscopy. Past Medical History  Past Medical History:   Diagnosis Date    Arthritis     Chronic radicular cervical pain     DDD (degenerative disc disease), cervical     Fracture     Ganglion cyst of wrist     Hearing loss     Hemorrhoids     History of kidney stones     History of smokeless tobacco use     Hx of neck injury     Sleep apnea     TMJ (dislocation of temporomandibular joint)        Surgical History  Past Surgical History:   Procedure Laterality Date    HX HERNIA REPAIR  2013    HX SEPTOPLASTY  1990        Medications  Current Outpatient Prescriptions   Medication Sig Dispense Refill    sertraline (ZOLOFT) 25 mg tablet TAKE 1 TAB BY MOUTH DAILY.  90 Tab 1       Allergies  Allergies   Allergen Reactions    Amoxicillin Other (comments)    Sulfamethoxazole-Trimethoprim Other (comments)     Felt \"weird\"       Family History  Family History   Problem Relation Age of Onset    Cancer Other      paternal aunt       Social History  Social History     Social History    Marital status:      Spouse name: N/A    Number of children: N/A    Years of education: N/A     Occupational History    manager      Social History Main Topics    Smoking status: Never Smoker    Smokeless tobacco: Never Used    Alcohol use No      Comment: 1989.      Drug use: No    Sexual activity: Yes     Partners: Female     Other Topics Concern    Caffeine Concern No    Occupational Exposure No    Hobby Hazards No    Sleep Concern No    Stress Concern No    Weight Concern No    Special Diet Yes    Exercise Yes    Seat Belt Yes    Self-Exams No     Social History Narrative       Review of Systems  Review of Systems - History obtained from chart review and the patient  General ROS: negative  Psychological ROS: positive for - anxiety, behavioral disorder, depression and mood swings  Ophthalmic ROS: positive for - uses glasses  Respiratory ROS: no cough, shortness of breath, or wheezing  Cardiovascular ROS: no chest pain or dyspnea on exertion  Gastrointestinal ROS: no abdominal pain, change in bowel habits, or black or bloody stools  Musculoskeletal ROS: negative  Neurological ROS: negative    Vital Signs  Visit Vitals    /77 (BP 1 Location: Left arm, BP Patient Position: Sitting)    Pulse 94    Temp 96.5 °F (35.8 °C) (Oral)    Resp 16    Ht 6' 2\" (1.88 m)    Wt 227 lb (103 kg)    SpO2 98%    BMI 29.15 kg/m2         Physical Exam  Physical Examination: General appearance - alert, well appearing, and in no distress, oriented to person, place, and time and acyanotic, in no respiratory distress  Mental status - alert, oriented to person, place, and time, affect appropriate to mood  Neck - supple, no significant adenopathy  Chest - clear to auscultation, no wheezes, rales or rhonchi, symmetric air entry  Heart - S1 and S2 normal  Neurological - alert, oriented, normal speech, no focal findings or movement disorder noted  Musculoskeletal - no joint tenderness, deformity or swelling    Diagnostics  Orders Placed This Encounter   [facility] ref [facility]     Referral Priority:   Routine     Referral Type:   Consultation     Referral Reason:   Specialty Services Required     Referred to Provider:   Pavel Mccabe MD    sertraline (ZOLOFT) 25 mg tablet     Sig: TAKE 1 TAB BY MOUTH DAILY. Dispense:  90 Tab     Refill:  1         Results  Results for orders placed or performed in visit on 10/02/15   AMB EXT LDL-C   Result Value Ref Range    LDL-C, External 87    AMB EXT CREATININE   Result Value Ref Range    Creatinine, External 0.8    AMB EXT PSA   Result Value Ref Range    PSA, External <=3.100      ASSESSMENT and PLAN    ICD-10-CM ICD-9-CM    1. Acute stress disorder F43.0 308.3 sertraline (ZOLOFT) 25 mg tablet   2. Mood disorder (HCC) F39 296.90 sertraline (ZOLOFT) 25 mg tablet   3. Screen for colon cancer Z12.11 V76.51 REFERRAL TO COLON AND RECTAL SURGERY     current treatment plan is effective, no change in therapy  reviewed diet, exercise and weight control  reviewed medications and side effects in detail    I have discussed the diagnosis with the patient and the intended plan of care as seen in the above orders. The patient has received an after-visit summary and questions were answered concerning future plans. I have discussed medication, side effects, and warnings with the patient in detail. The patient verbalized understanding and is in agreement with the plan of care. The patient will contact the office with any additional concerns.     Jhon Crooks MD

## 2017-12-05 ENCOUNTER — OFFICE VISIT (OUTPATIENT)
Dept: FAMILY MEDICINE CLINIC | Age: 57
End: 2017-12-05

## 2017-12-05 VITALS
SYSTOLIC BLOOD PRESSURE: 105 MMHG | HEART RATE: 83 BPM | DIASTOLIC BLOOD PRESSURE: 67 MMHG | HEIGHT: 74 IN | RESPIRATION RATE: 16 BRPM | OXYGEN SATURATION: 98 % | TEMPERATURE: 96 F | WEIGHT: 233 LBS | BODY MASS INDEX: 29.9 KG/M2

## 2017-12-05 DIAGNOSIS — R35.0 URINARY FREQUENCY: ICD-10-CM

## 2017-12-05 DIAGNOSIS — R39.9 LOWER URINARY TRACT SYMPTOMS (LUTS): Primary | ICD-10-CM

## 2017-12-05 LAB
BILIRUB UR QL STRIP: NEGATIVE
GLUCOSE UR-MCNC: NEGATIVE MG/DL
KETONES P FAST UR STRIP-MCNC: NEGATIVE MG/DL
PH UR STRIP: 6 [PH] (ref 4.6–8)
PROT UR QL STRIP: NEGATIVE
SP GR UR STRIP: 1.01 (ref 1–1.03)
UA UROBILINOGEN AMB POC: NORMAL (ref 0.2–1)
URINALYSIS CLARITY POC: CLEAR
URINALYSIS COLOR POC: YELLOW
URINE BLOOD POC: NEGATIVE
URINE LEUKOCYTES POC: NEGATIVE
URINE NITRITES POC: NEGATIVE

## 2017-12-05 NOTE — LETTER
12/5/2017 1:00 PM 
 
Mr. Juhi Gu [address] [facility] Dear Juhi Gu: 
 
Please find your most recent results below. Resulted Orders AMB POC URINALYSIS DIP STICK AUTO W/O MICRO Result Value Ref Range Color (UA POC) Yellow Clarity (UA POC) Clear Glucose (UA POC) Negative Negative Bilirubin (UA POC) Negative Negative Ketones (UA POC) Negative Negative Specific gravity (UA POC) 1.010 1.001 - 1.035 Blood (UA POC) Negative Negative pH (UA POC) 6.0 4.6 - 8.0 Protein (UA POC) Negative Negative Urobilinogen (UA POC) 0.2 mg/dL 0.2 - 1 Nitrites (UA POC) Negative Negative Leukocyte esterase (UA POC) Negative Negative Normal result. RECOMMENDATIONS: 
None. Keep up the good work! Please call me if you have any questions: [phone] Sincerely, Shyam Carnes MD

## 2017-12-05 NOTE — MR AVS SNAPSHOT
Visit Information Date & Time Provider Department Dept. Phone Encounter #  
 12/5/2017  8:00 AM MD Juany Pedro [phone] [id number] Follow-up Instructions Return if symptoms worsen or fail to improve. Your Appointments 1/29/2018  1:30 PM  
ROUTINE CARE with MD Juany Pedro ([facility]) Appt Note: [id number] for acute stress; rov for acute stress [address] [id number] [address]  
  
   
 [address] Upcoming Health Maintenance Date Due DTaP/Tdap/Td series (1 - Tdap) 12/20/1983 FOBT Q 1 YEAR AGE 50-75 12/20/2012 Allergies as of 12/5/2017  Review Complete On: 12/5/2017 By: Johnathan Rodriguez MD  
  
 Severity Noted Reaction Type Reactions Amoxicillin Medium 01/30/2008    Other (comments) Sulfamethoxazole-trimethoprim  11/25/2013    Other (comments) Felt \"weird\" Current Immunizations  Never Reviewed No immunizations on file. Not reviewed this visit You Were Diagnosed With   
  
 Codes Comments Lower urinary tract symptoms (LUTS)    -  Primary ICD-10-CM: R39.9 ICD-9-CM: 788.99 Urinary frequency     ICD-10-CM: R35.0 ICD-9-CM: 788.41 Vitals BP Pulse Temp Resp Height(growth percentile) Weight(growth percentile) 105/67 (BP 1 Location: Left arm, BP Patient Position: Sitting) 83 96 °F (35.6 °C) (Oral) 16 6' 2\" (1.88 m) 233 lb (105.7 kg) SpO2 BMI Smoking Status 98% 29.92 kg/m2 Never Smoker BMI and BSA Data Body Mass Index Body Surface Area  
 29.92 kg/m 2 2.35 m 2 Preferred Pharmacy Pharmacy Name Phone [id number] [address] Your Updated Medication List  
  
   
This list is accurate as of: 12/5/17  8:45 AM.  Always use your most recent med list.  
  
  
  
  
 sertraline 25 mg tablet Commonly known as:  ZOLOFT  
TAKE 1 TAB BY MOUTH DAILY. We Performed the Following AMB POC URINALYSIS DIP STICK AUTO W/O MICRO [35903 CPT(R)] REFERRAL TO UROLOGY [YHZ608 Custom] Comments: LUTS with urinary frequency. Follow-up Instructions Return if symptoms worsen or fail to improve. Referral Information Referral ID Referred By Referred To  
  
 [id number] [place] Basket N Not Available Visits Status Start Date End Date 1 New Request 12/5/17 12/5/18 If your referral has a status of pending review or denied, additional information will be sent to support the outcome of this decision. Introducing [facility]! Dear Naheed Sinclair: Thank you for requesting a [employer] account. Our records indicate that you already have an active [employer] account. You can access your account anytime at [email or web address] Did you know that you can access your hospital and ER discharge instructions at any time in [employer]? You can also review all of your test results from your hospital stay or ER visit. Additional Information If you have questions, please visit the Frequently Asked Questions section of the [employer] website at [email or web address] Remember, [employer] is NOT to be used for urgent needs. For medical emergencies, dial 911. Now available from your iPhone and Android! Please provide this summary of care documentation to your next provider. Your primary care clinician is listed as Trina Gee. If you have any questions after today's visit, please call [phone].

## 2017-12-05 NOTE — PROGRESS NOTES
Chief Complaint   Patient presents with    Urinary Frequency     Patient has had urinary frequency for some time now. He states that he has history of prostate problem. He had PSA done March this year and it was low 0.512.    1. Have you been to the ER, urgent care clinic since your last visit? Hospitalized since your last visit? No    2. Have you seen or consulted any other health care providers outside of the [address] [place] [address] since your last visit? Include any pap smears or colon screening. No     HPI  José Ramos comes in for follow-up care. Patient has L UTS symptoms. He does have incomplete bladder emptying, straining when he passes urine and the has noticed post micturition dribbling. No dysuria or frequency of micturition or hematuria. He has in the past been told this could be due to an enlarged prostate. I will check his PSA today. Will also do a urinalysis. This is negative. He has lower urinary tract symptoms and I will refer him to the urologist for further evaluation and management. Past Medical History  Past Medical History:   Diagnosis Date    Arthritis     Chronic radicular cervical pain     DDD (degenerative disc disease), cervical     Fracture     Ganglion cyst of wrist     Hearing loss     Hemorrhoids     History of kidney stones     History of smokeless tobacco use     Hx of neck injury     Sleep apnea     TMJ (dislocation of temporomandibular joint)        Surgical History  Past Surgical History:   Procedure Laterality Date    HX HERNIA REPAIR  2013    HX SEPTOPLASTY  1990        Medications  Current Outpatient Prescriptions   Medication Sig Dispense Refill    sertraline (ZOLOFT) 25 mg tablet TAKE 1 TAB BY MOUTH DAILY.  90 Tab 1       Allergies  Allergies   Allergen Reactions    Amoxicillin Other (comments)    Sulfamethoxazole-Trimethoprim Other (comments)     Felt \"weird\"       Family History  Family History   Problem Relation Age of Onset    Cancer Other      paternal aunt       Social History  Social History     Social History    Marital status:      Spouse name: N/A    Number of children: N/A    Years of education: N/A     Occupational History    manager      Social History Main Topics    Smoking status: Never Smoker    Smokeless tobacco: Never Used    Alcohol use No      Comment: 1989.      Drug use: No    Sexual activity: Yes     Partners: Female     Other Topics Concern    Caffeine Concern No    Occupational Exposure No    Hobby Hazards No    Sleep Concern No    Stress Concern No    Weight Concern No    Special Diet Yes    Exercise Yes    Seat Belt Yes    Self-Exams No     Social History Narrative       Review of Systems  Review of Systems - History obtained from chart review and the patient  General ROS: negative for - chills, fatigue, fever or malaise  Psychological ROS: positive for - mood swings  Ophthalmic ROS: positive for - uses glasses  ENT ROS: negative  Allergy and Immunology ROS: negative  Hematological and Lymphatic ROS: negative  Respiratory ROS: no cough, shortness of breath, or wheezing  Cardiovascular ROS: negative  Gastrointestinal ROS: no abdominal pain, change in bowel habits, or black or bloody stools  Genito-Urinary ROS: positive for - change in urinary stream and urinary frequency/urgency  Musculoskeletal ROS: negative  Neurological ROS: negative    Vital Signs  Visit Vitals    /67 (BP 1 Location: Left arm, BP Patient Position: Sitting)    Pulse 83    Temp 96 °F (35.6 °C) (Oral)    Resp 16    Ht 6' 2\" (1.88 m)    Wt 233 lb (105.7 kg)    SpO2 98%    BMI 29.92 kg/m2         Physical Exam  Physical Examination: General appearance - alert, well appearing, and in no distress, oriented to person, place, and time and acyanotic, in no respiratory distress  Mental status - alert, oriented to person, place, and time, depressed mood  Neck - supple, no significant adenopathy  Chest - no tachypnea, retractions or cyanosis  Heart - normal rate and regular rhythm  Back exam - limited range of motion  Musculoskeletal - no joint tenderness, deformity or swelling  Extremities - no pedal edema noted    Results  Results for orders placed or performed in visit on 10/02/15   AMB EXT LDL-C   Result Value Ref Range    LDL-C, External 87    AMB EXT CREATININE   Result Value Ref Range    Creatinine, External 0.8    AMB EXT PSA   Result Value Ref Range    PSA, External <=3.100        ASSESSMENT and PLAN    ICD-10-CM ICD-9-CM    1. Lower urinary tract symptoms (LUTS) R39.9 788.99 REFERRAL TO UROLOGY      CANCELED: REFERRAL TO UROLOGY   2. Urinary frequency R35.0 788.41 AMB POC URINALYSIS DIP STICK AUTO W/O MICRO      REFERRAL TO UROLOGY      CANCELED: REFERRAL TO UROLOGY     lab results and schedule of future lab studies reviewed with patient  reviewed diet, exercise and weight control  reviewed medications and side effects in detail    I have discussed the diagnosis with the patient and the intended plan of care as seen in the above orders. The patient has received an after-visit summary and questions were answered concerning future plans. I have discussed medication, side effects, and warnings with the patient in detail. The patient verbalized understanding and is in agreement with the plan of care. The patient will contact the office with any additional concerns.     Haleigh Chau MD

## 2018-03-06 ENCOUNTER — PATIENT OUTREACH (OUTPATIENT)
Dept: FAMILY MEDICINE CLINIC | Age: 58
End: 2018-03-06

## 2018-03-06 ENCOUNTER — TELEPHONE (OUTPATIENT)
Dept: FAMILY MEDICINE CLINIC | Age: 58
End: 2018-03-06

## 2018-03-06 DIAGNOSIS — Z12.11 ENCOUNTER FOR FIT (FECAL IMMUNOCHEMICAL TEST) SCREENING: Primary | ICD-10-CM

## 2018-04-10 ENCOUNTER — PATIENT OUTREACH (OUTPATIENT)
Dept: FAMILY MEDICINE CLINIC | Age: 58
End: 2018-04-10

## 2018-05-15 ENCOUNTER — PATIENT OUTREACH (OUTPATIENT)
Dept: FAMILY MEDICINE CLINIC | Age: 58
End: 2018-05-15

## 2018-05-15 NOTE — PROGRESS NOTES
health screening:    Mr. Melquiades Coleman confirmed receipt of fit kit through the mail and has mailed his specimen \"Friday I think it was. \" Will continue to monitor chart for results.

## 2018-05-31 ENCOUNTER — PATIENT OUTREACH (OUTPATIENT)
Dept: FAMILY MEDICINE CLINIC | Age: 58
End: 2018-05-31

## 2018-05-31 ENCOUNTER — TELEPHONE (OUTPATIENT)
Dept: FAMILY MEDICINE CLINIC | Age: 58
End: 2018-05-31

## 2018-05-31 DIAGNOSIS — Z12.11 ENCOUNTER FOR FIT (FECAL IMMUNOCHEMICAL TEST) SCREENING: Primary | ICD-10-CM

## 2018-06-26 ENCOUNTER — TELEPHONE (OUTPATIENT)
Dept: FAMILY MEDICINE CLINIC | Age: 58
End: 2018-06-26

## 2018-06-26 NOTE — TELEPHONE ENCOUNTER
Patient is scheduled 7/3/18 for CPE. He is requesting to have his labs taken prior to his appointment.

## 2018-06-27 DIAGNOSIS — E07.9 THYROID DISORDER: ICD-10-CM

## 2018-06-27 DIAGNOSIS — Z00.00 GENERAL MEDICAL EXAM: Primary | ICD-10-CM

## 2018-06-27 DIAGNOSIS — Z12.5 SCREENING FOR PROSTATE CANCER: ICD-10-CM

## 2018-06-28 NOTE — TELEPHONE ENCOUNTER
Please let patient know he can come in for fasting labs. Requests have been placed.   Yessi Guevara MD

## 2018-07-18 ENCOUNTER — PATIENT OUTREACH (OUTPATIENT)
Dept: FAMILY MEDICINE CLINIC | Age: 58
End: 2018-07-18

## 2018-07-18 NOTE — PROGRESS NOTES
NN health screening:    Noted no show to [facility] with Dr. Mirela Milton this month so another delay in getting the fit kit sample to the lab. Will continue to follow.

## 2018-08-28 ENCOUNTER — PATIENT OUTREACH (OUTPATIENT)
Dept: FAMILY MEDICINE CLINIC | Age: 58
End: 2018-08-28

## 2018-10-11 ENCOUNTER — PATIENT OUTREACH (OUTPATIENT)
Dept: FAMILY MEDICINE CLINIC | Age: 58
End: 2018-10-11

## 2018-10-31 ENCOUNTER — OFFICE VISIT (OUTPATIENT)
Dept: FAMILY MEDICINE CLINIC | Age: 58
End: 2018-10-31

## 2018-10-31 VITALS
BODY MASS INDEX: 29.65 KG/M2 | RESPIRATION RATE: 20 BRPM | SYSTOLIC BLOOD PRESSURE: 102 MMHG | DIASTOLIC BLOOD PRESSURE: 70 MMHG | TEMPERATURE: 95.9 F | HEART RATE: 85 BPM | WEIGHT: 231 LBS | HEIGHT: 74 IN

## 2018-10-31 DIAGNOSIS — Z12.11 SCREEN FOR COLON CANCER: ICD-10-CM

## 2018-10-31 DIAGNOSIS — K60.2 ANAL FISSURE: ICD-10-CM

## 2018-10-31 DIAGNOSIS — E66.3 OVERWEIGHT (BMI 25.0-29.9): ICD-10-CM

## 2018-10-31 DIAGNOSIS — K64.9 HEMORRHOIDS, UNSPECIFIED HEMORRHOID TYPE: Primary | ICD-10-CM

## 2018-10-31 DIAGNOSIS — F39 MOOD DISORDER (HCC): ICD-10-CM

## 2018-10-31 NOTE — PROGRESS NOTES
Chief Complaint Patient presents with  Follow-up Hemorrhoids 1. Have you been to the ER, urgent care clinic since your last visit? Hospitalized since your last visit? No 
 
2. Have you seen or consulted any other health care providers outside of the [address] [place] [address] since your last visit? Include any pap smears or colon screening. No  
 
HPI Yoel Blackwell comes in for acute care. Patient has a history of hemorrhoids. Lately these have been acting up. Has felt mass and swelling around his rectal and perianal area. These have been reducible. Not much bleeding. Hemorrhoids have receded somewhat but that he did develop a fissure that is painful and discomforting. In the past he has taken nifedipine cream for this. This did help. He would like a refill of medication. I did discuss with the patient. He needs to have a colonoscopy done. I will place referral for this. He also may benefit from seeing the rectal and colon surgeon to evaluate the hemorrhoids and see if any of these needs to be excised. In the meantime I will refill his medication. Patient has BMI of 29.66. This is overweight. We discussed normal BMI. He will do lifestyle and dietary modification in an effort to bring down his weight. Patient has mood disorder. He does have a history of depression. Currently not on medication. He does do supportive care measures but overall this is stable. Past Medical History Past Medical History:  
Diagnosis Date  Arthritis  Chronic radicular cervical pain  DDD (degenerative disc disease), cervical   
 Fracture  Ganglion cyst of wrist   
 Hearing loss  Hemorrhoids  History of kidney stones  History of smokeless tobacco use  Hx of neck injury  Sleep apnea  TMJ (dislocation of temporomandibular joint) Surgical History Past Surgical History:  
Procedure Laterality Date  HX HERNIA REPAIR  2013 [address]reras 55 Medications Current Outpatient Medications Medication Sig Dispense Refill  nitroglycerin 0.2 % 0.2 oint rectal ointment Insert 1 Inch into rectum every twelve (12) hours every twelve (12) hours. 30 g 0  
 sertraline (ZOLOFT) 25 mg tablet TAKE 1 TAB BY MOUTH DAILY. 90 Tab 1 Allergies Allergies Allergen Reactions  Amoxicillin Other (comments)  Sulfamethoxazole-Trimethoprim Other (comments) Felt \"weird\" Family History Family History Problem Relation Age of Onset  Cancer Other   
     paternal aunt Social History Social History Socioeconomic History  Marital status:  Spouse name: Not on file  Number of children: Not on file  Years of education: Not on file  Highest education level: Not on file Social Needs  Financial resource strain: Not on file  Food insecurity - worry: Not on file  Food insecurity - inability: Not on file  Transportation needs - medical: Not on file  Transportation needs - non-medical: Not on file Occupational History  Occupation: manager Tobacco Use  Smoking status: Never Smoker  Smokeless tobacco: Never Used Substance and Sexual Activity  Alcohol use: No  
  Alcohol/week: 0.0 oz  
  Comment: 1989.  Drug use: No  
 Sexual activity: Yes  
  Partners: Female Other Topics Concern [address] Service Not Asked  Blood Transfusions Not Asked  Caffeine Concern No  
 Occupational Exposure No  
 Hobby Hazards No  
 Sleep Concern No  
 Stress Concern No  
 Weight Concern No  
 Special Diet Yes  Back Care Not Asked  Exercise Yes  Bike Helmet Not Asked [address] Yes  Self-Exams No  
Social History Narrative  Not on file Review of Systems Review of Systems - History obtained from chart review and the patient General ROS: negative Psychological ROS: negative Ophthalmic ROS: negative ENT ROS: negative Allergy and Immunology ROS: negative Hematological and Lymphatic ROS: negative Endocrine ROS: negative Respiratory ROS: no cough, shortness of breath, or wheezing Cardiovascular ROS: no chest pain or dyspnea on exertion Gastrointestinal ROS: Hemorrhoids and anal fissure Genito-Urinary ROS: no dysuria, trouble voiding, or hematuria Musculoskeletal ROS: positive for - joint pain Neurological ROS: no TIA or stroke symptoms Dermatological ROS: negative Vital Signs Visit Vitals /70 (BP 1 Location: Left arm, BP Patient Position: Sitting) Pulse 85 Temp 95.9 °F (35.5 °C) (Oral) Resp 20 Ht 6' 2\" (1.88 m) Wt 231 lb (104.8 kg) BMI 29.66 kg/m² Physical Exam 
Physical Examination: General appearance - oriented to person, place, and time and acyanotic, in no respiratory distress Mental status - alert, oriented to person, place, and time, affect appropriate to mood Neck - supple, no significant adenopathy Lymphatics - no palpable lymphadenopathy Chest - clear to auscultation, no wheezes, rales or rhonchi, symmetric air entry Heart - normal rate and regular rhythm, S1 and S2 normal 
Abdomen - no rebound tenderness noted Neurological - motor and sensory grossly normal bilaterally Musculoskeletal - full range of motion without pain Extremities - no pedal edema noted Results Results for orders placed or performed in visit on 12/05/17 AMB POC URINALYSIS DIP STICK AUTO W/O MICRO Result Value Ref Range Color (UA POC) Yellow Clarity (UA POC) Clear Glucose (UA POC) Negative Negative Bilirubin (UA POC) Negative Negative Ketones (UA POC) Negative Negative Specific gravity (UA POC) 1.010 1.001 - 1.035 Blood (UA POC) Negative Negative pH (UA POC) 6.0 4.6 - 8.0 Protein (UA POC) Negative Negative Urobilinogen (UA POC) 0.2 mg/dL 0.2 - 1 Nitrites (UA POC) Negative Negative Leukocyte esterase (UA POC) Negative Negative ASSESSMENT and PLAN 
  ICD-10-CM ICD-9-CM 1. Hemorrhoids, unspecified hemorrhoid type K64.9 455.6 REFERRAL TO COLON AND RECTAL SURGERY 2. Screen for colon cancer Z12.11 V76.51 REFERRAL TO COLON AND RECTAL SURGERY 3. Anal fissure K60.2 565.0 nitroglycerin 0.2 % 0.2 oint rectal ointment 4. Overweight (BMI 25.0-29. 9) E66.3 278.02   
5. Mood disorder ([facility] F39 296.90 Discussed the patient's BMI with him. The BMI follow up plan is as follows:  
dietary management education, guidance, and counseling 
encourage exercise 
monitor weight 
current treatment plan is effective, no change in therapy 
lab results and schedule of future lab studies reviewed with patient 
reviewed diet, exercise and weight control 
cardiovascular risk and specific lipid/LDL goals reviewed 
reviewed medications and side effects in detail I have discussed the diagnosis with the patient and the intended plan of care as seen in the above orders. The patient has received an after-visit summary and questions were answered concerning future plans. I have discussed medication, side effects, and warnings with the patient in detail. The patient verbalized understanding and is in agreement with the plan of care. The patient will contact the office with any additional concerns.  
 
Armando Bray MD

## 2018-10-31 NOTE — PATIENT INSTRUCTIONS
Body Mass Index: Care Instructions Your Care Instructions Body mass index (BMI) can help you see if your weight is raising your risk for health problems. It uses a formula to compare how much you weigh with how tall you are. · A BMI lower than 18.5 is considered underweight. · A BMI between 18.5 and 24.9 is considered healthy. · A BMI between 25 and 29.9 is considered overweight. A BMI of 30 or higher is considered obese. If your BMI is in the normal range, it means that you have a lower risk for weight-related health problems. If your BMI is in the overweight or obese range, you may be at increased risk for weight-related health problems, such as high blood pressure, heart disease, stroke, arthritis or joint pain, and diabetes. If your BMI is in the underweight range, you may be at increased risk for health problems such as fatigue, lower protection (immunity) against illness, muscle loss, bone loss, hair loss, and hormone problems. BMI is just one measure of your risk for weight-related health problems. You may be at higher risk for health problems if you are not active, you eat an unhealthy diet, or you drink too much alcohol or use tobacco products. Follow-up care is a key part of your treatment and safety. Be sure to make and go to all appointments, and call your doctor if you are having problems. It's also a good idea to know your test results and keep a list of the medicines you take. How can you care for yourself at home? · Practice healthy eating habits. This includes eating plenty of fruits, vegetables, whole grains, lean protein, and low-fat dairy. · If your doctor recommends it, get more exercise. Walking is a good choice. Bit by bit, increase the amount you walk every day. Try for at least 30 minutes on most days of the week. · Do not smoke. Smoking can increase your risk for health problems.  If you need help quitting, talk to your doctor about stop-smoking programs and medicines. These can increase your chances of quitting for good. · Limit alcohol to 2 drinks a day for men and 1 drink a day for women. Too much alcohol can cause health problems. If you have a BMI higher than 25 · Your doctor may do other tests to check your risk for weight-related health problems. This may include measuring the distance around your waist. A waist measurement of more than 40 inches in men or 35 inches in women can increase the risk of weight-related health problems. · Talk with your doctor about steps you can take to stay healthy or improve your health. You may need to make lifestyle changes to lose weight and stay healthy, such as changing your diet and getting regular exercise. If you have a BMI lower than 18.5 · Your doctor may do other tests to check your risk for health problems. · Talk with your doctor about steps you can take to stay healthy or improve your health. You may need to make lifestyle changes to gain or maintain weight and stay healthy, such as getting more healthy foods in your diet and doing exercises to build muscle. Where can you learn more? Go to http://silvina-parth.info/. Enter S176 in the search box to learn more about \"Body Mass Index: Care Instructions. \" Current as of: October 13, 2016 Content Version: 11.4 © 7594-4587 [employer]. Care instructions adapted under license by [employer] (which disclaims liability or warranty for this information). If you have questions about a medical condition or this instruction, always ask your healthcare professional. [address] any warranty or liability for your use of this information.

## 2018-11-13 ENCOUNTER — PATIENT OUTREACH (OUTPATIENT)
Dept: FAMILY MEDICINE CLINIC | Age: 58
End: 2018-11-13

## 2018-11-13 NOTE — PROGRESS NOTES
health screening:    Noted referral for Dr. Alesha Veras for [facility] screening now. Will follow.

## 2018-12-11 ENCOUNTER — PATIENT OUTREACH (OUTPATIENT)
Dept: FAMILY MEDICINE CLINIC | Age: 58
End: 2018-12-11

## 2019-01-07 ENCOUNTER — OFFICE VISIT (OUTPATIENT)
Dept: FAMILY MEDICINE CLINIC | Age: 59
End: 2019-01-07

## 2019-01-07 VITALS
OXYGEN SATURATION: 100 % | HEIGHT: 75 IN | TEMPERATURE: 96.1 F | RESPIRATION RATE: 20 BRPM | DIASTOLIC BLOOD PRESSURE: 73 MMHG | SYSTOLIC BLOOD PRESSURE: 137 MMHG | BODY MASS INDEX: 27.48 KG/M2 | WEIGHT: 221 LBS | HEART RATE: 77 BPM

## 2019-01-07 DIAGNOSIS — R94.31 ABNORMAL EKG: ICD-10-CM

## 2019-01-07 DIAGNOSIS — Z01.818 PREOP EXAMINATION: Primary | ICD-10-CM

## 2019-01-07 DIAGNOSIS — G89.29 CHRONIC PAIN OF RIGHT KNEE: ICD-10-CM

## 2019-01-07 DIAGNOSIS — M25.561 CHRONIC PAIN OF RIGHT KNEE: ICD-10-CM

## 2019-01-07 DIAGNOSIS — M17.11 PRIMARY OSTEOARTHRITIS OF RIGHT KNEE: ICD-10-CM

## 2019-01-07 PROBLEM — F39 MOOD DISORDER (HCC): Status: RESOLVED | Noted: 2017-10-26 | Resolved: 2019-01-07

## 2019-01-07 NOTE — PROGRESS NOTES
Chief Complaint Patient presents with  Pre-op Exam  
  Scheduled for Right Total Knee Arthroplasty on 01- 1. Have you been to the ER, urgent care clinic since your last visit? Hospitalized since your last visit? No 
 
2. Have you seen or consulted any other health care providers outside of the [address] [place] [address] since your last visit? Include any pap smears or colon screening. No  
 
HPI Tere Hitchcock comes in for preop evaluation. Referring physician: Dr. Kristian Logan Date of surgery: 01/16/2019 Place of surgery: [facility] 
Indication for surgery: Right knee osteoarthritis Planned surgery: Right total knee arthroplasty Tere Hitchcock has right knee osteoarthritis with the deformity of the knee that has resulted in deviation of the leg. This causes pain and discomfort when he is active. He has been followed up by the orthopedist.  Plans to have right total knee arthroplasty. Patient did have labs done. These are stable. He had an EKG done that was reported as septal infarct age undetermined. He also had a chest x-ray done that is normal.  Patient denies chest pain, shortness of breath or diaphoresis. I will sent for an echocardiogram.  I will also place referral to the cardiologist.  I will review after I get the echocardiogram results. If there are no wall motion abnormalities then he would be stable enough to have surgery but will still need follow-up by the cardiologist.  He is not on any medication. Past Medical History Past Medical History:  
Diagnosis Date  Arthritis  Chronic radicular cervical pain  DDD (degenerative disc disease), cervical   
 Fracture  Ganglion cyst of wrist   
 Hearing loss  Hemorrhoids  History of kidney stones  History of smokeless tobacco use  Hx of neck injury  Joint pain  Sleep apnea   
 pt. denies  TMJ (dislocation of temporomandibular joint) Surgical History Past Surgical History:  
Procedure Laterality Date  HX HERNIA REPAIR  2013  HX ORTHOPAEDIC Left   
 leg repair [address]reras 55 Medications Current Outpatient Medications Medication Sig Dispense Refill  acetaminophen (TYLENOL) 325 mg tablet Take 325 mg by mouth every four (4) hours as needed for Pain (as needed). Allergies Allergies Allergen Reactions  Amoxicillin Other (comments) Itching rash  Sulfamethoxazole-Trimethoprim Other (comments) Felt \"weird\" Family History Family History Problem Relation Age of Onset  Cancer Other   
     paternal aunt  Diabetes Mother  Heart Disease Paternal Grandmother Social History Social History Socioeconomic History  Marital status:  Spouse name: Not on file  Number of children: Not on file  Years of education: Not on file  Highest education level: Not on file Social Needs  Financial resource strain: Not on file  Food insecurity - worry: Not on file  Food insecurity - inability: Not on file  Transportation needs - medical: Not on file  Transportation needs - non-medical: Not on file Occupational History  Occupation: manager Tobacco Use  Smoking status: Never Smoker  Smokeless tobacco: Never Used Substance and Sexual Activity  Alcohol use: No  
  Alcohol/week: 0.0 oz  
  Comment: 1989.  Drug use: No  
 Sexual activity: Yes  
  Partners: Female Other Topics Concern [address] Service Not Asked  Blood Transfusions Not Asked  Caffeine Concern No  
 Occupational Exposure No  
 Hobby Hazards No  
 Sleep Concern No  
 Stress Concern No  
 Weight Concern No  
 Special Diet Yes  Back Care Not Asked  Exercise Yes  Bike Helmet Not Asked [address] Yes  Self-Exams No  
Social History Narrative  Not on file Review of Systems Review of Systems -14 point review of systems is negative except as noted above in the HPI. Vital Signs Visit Vitals /73 (BP 1 Location: Left arm, BP Patient Position: Sitting) Pulse 77 Temp 96.1 °F (35.6 °C) (Oral) Resp 20 Ht 6' 2.5\" (1.892 m) Wt 221 lb (100.2 kg) SpO2 100% BMI 28.00 kg/m² Physical Exam 
Physical Examination: General appearance - alert, well appearing, and in no distress, oriented to person, place, and time, acyanotic, in no respiratory distress and well hydrated Mental status - alert, oriented to person, place, and time Eyes - sclera anicteric Nose - normal and patent, no erythema, discharge or polyps Mouth - mucous membranes moist, pharynx normal without lesions Neck - supple, no significant adenopathy Lymphatics - no palpable lymphadenopathy Chest - clear to auscultation, no wheezes, rales or rhonchi, symmetric air entry Heart - normal rate, regular rhythm, normal S1, S2, no murmurs, rubs, clicks or gallops Abdomen - soft, nontender, nondistended, no masses or organomegaly Back exam - full range of motion, no tenderness, palpable spasm or pain on motion Neurological - alert, oriented, normal speech, no focal findings or movement disorder noted Musculoskeletal -right knee with medial deviation, slight tenderness around the knee margins, no crepitus Extremities - no pedal edema noted, intact peripheral pulses Results Results for orders placed or performed during the hospital encounter of 12/26/18 CULTURE, MRSA Result Value Ref Range Culture result No Methicillin Resistant Staphylococcus Isolated PTT Result Value Ref Range aPTT 31.9 25.1 - 36.5 seconds PROTHROMBIN TIME + INR Result Value Ref Range Prothrombin time 11.6 10.2 - 12.9 seconds INR 1.0 0.1 - 1.1 ALBUMIN Result Value Ref Range Albumin 3.8 3.4 - 5.0 gm/dl METABOLIC PANEL, BASIC Result Value Ref Range  Sodium 141 136 - 145 mEq/L  
 Potassium 4.5 3.5 - 5.1 mEq/L Chloride 108 (H) 98 - 107 mEq/L  
 CO2 31 21 - 32 mEq/L Glucose 88 74 - 106 mg/dl BUN 16 7 - 25 mg/dl Creatinine 0.9 0.6 - 1.3 mg/dl GFR est AA >60    
 GFR est non-AA >60 Calcium 8.4 (L) 8.5 - 10.1 mg/dl Anion gap 3 (L) 5 - 15 mmol/L  
CBC WITH AUTOMATED DIFF Result Value Ref Range WBC 5.2 4.0 - 11.0 1000/mm3 RBC 4.70 3.80 - 5.70 M/uL  
 HGB 14.6 12.4 - 17.2 gm/dl HCT 44.1 37.0 - 50.0 % MCV 93.8 80.0 - 98.0 fL  
 MCH 31.1 23.0 - 34.6 pg  
 MCHC 33.1 30.0 - 36.0 gm/dl PLATELET 117 995 - 240 1000/mm3 MPV 9.6 6.0 - 10.0 fL  
 RDW-SD 41.6 35.1 - 43.9 NRBC 0 0 - 0 IMMATURE GRANULOCYTES 0.2 0.0 - 3.0 % NEUTROPHILS 52.7 34 - 64 % LYMPHOCYTES 31.3 28 - 48 % MONOCYTES 12.1 1 - 13 % EOSINOPHILS 2.5 0 - 5 % BASOPHILS 1.2 0 - 3 % EKG, 12 LEAD, INITIAL Result Value Ref Range Ventricular Rate 58 BPM  
 Atrial Rate 58 BPM  
 P-R Interval 148 ms QRS Duration 90 ms Q-T Interval 390 ms QTC Calculation (Bezet) 382 ms Calculated P Axis 24 degrees Calculated R Axis 55 degrees Calculated T Axis 52 degrees Diagnosis Sinus bradycardia Septal infarct , age undetermined Abnormal ECG No previous ECGs available Confirmed by Sonny Bradford M.D., Earnstine Coombs ([id number]) on 12/26/2018 5:49:23 PM 
  
 
 
ASSESSMENT and PLAN 
  ICD-10-CM ICD-9-CM 1. Preop examination Z01.818 V72.84   
2. Abnormal EKG R94.31 794.31 ECHO ADULT COMPLETE  
   REFERRAL TO CARDIOLOGY 3. Chronic pain of right knee M25.561 719.46   
 G89.29 338.29 4. Primary osteoarthritis of right knee M17.11 715.16   
 
reviewed diet, exercise and weight control 
reviewed medications and side effects in detail 
radiology results and schedule of future radiology studies reviewed with patient I have discussed the diagnosis with the patient and the intended plan of care as seen in the above orders.  The patient has received an after-visit summary and questions were answered concerning future plans. I have discussed medication, side effects, and warnings with the patient in detail. The patient verbalized understanding and is in agreement with the plan of care. The patient will contact the office with any additional concerns.  
 
Kita Lange MD

## 2019-01-10 NOTE — PROGRESS NOTES
Patient had an echocardiogram done that is stable. He is medically stabilized and cleared to proceed with planned surgery.  
Moreno Willis MD

## 2019-01-16 PROBLEM — M19.90 OSTEOARTHRITIS: Status: ACTIVE | Noted: 2019-01-16

## 2019-01-16 PROBLEM — Z96.651 S/P TKR (TOTAL KNEE REPLACEMENT) USING CEMENT, RIGHT: Status: ACTIVE | Noted: 2019-01-16

## 2019-03-05 ENCOUNTER — OFFICE VISIT (OUTPATIENT)
Dept: FAMILY MEDICINE CLINIC | Age: 59
End: 2019-03-05

## 2019-03-05 VITALS
BODY MASS INDEX: 27.48 KG/M2 | HEIGHT: 75 IN | SYSTOLIC BLOOD PRESSURE: 135 MMHG | WEIGHT: 221 LBS | DIASTOLIC BLOOD PRESSURE: 64 MMHG | HEART RATE: 74 BPM | TEMPERATURE: 98 F | OXYGEN SATURATION: 100 % | RESPIRATION RATE: 16 BRPM

## 2019-03-05 DIAGNOSIS — Z96.651 S/P TOTAL KNEE ARTHROPLASTY, RIGHT: ICD-10-CM

## 2019-03-05 DIAGNOSIS — R10.30 LOWER ABDOMINAL PAIN: Primary | ICD-10-CM

## 2019-03-05 DIAGNOSIS — M25.561 RIGHT KNEE PAIN, UNSPECIFIED CHRONICITY: ICD-10-CM

## 2019-03-05 DIAGNOSIS — Z12.11 SCREEN FOR COLON CANCER: ICD-10-CM

## 2019-03-05 LAB
BILIRUB UR QL STRIP: NEGATIVE
GLUCOSE UR-MCNC: NEGATIVE MG/DL
KETONES P FAST UR STRIP-MCNC: NEGATIVE MG/DL
PH UR STRIP: 6 [PH] (ref 4.6–8)
PROT UR QL STRIP: NEGATIVE
SP GR UR STRIP: 1.02 (ref 1–1.03)
UA UROBILINOGEN AMB POC: NORMAL (ref 0.2–1)
URINALYSIS CLARITY POC: CLEAR
URINALYSIS COLOR POC: YELLOW
URINE BLOOD POC: NEGATIVE
URINE LEUKOCYTES POC: NEGATIVE
URINE NITRITES POC: NEGATIVE

## 2019-03-05 RX ORDER — IBUPROFEN 200 MG
TABLET ORAL
COMMUNITY

## 2019-03-05 NOTE — PROGRESS NOTES
Chief Complaint   Patient presents with    Abdominal Pain     concerns about possible hernia    Knee Pain     Right Total knee replacement 1/16/19     1. Have you been to the ER, urgent care clinic since your last visit? Hospitalized since your last visit? No    2. Have you seen or consulted any other health care providers outside of the [address] [place] [address] since your last visit? Include any pap smears or colon screening. No     HPI  Dewayne Presume comes in for follow-up care. Patient is concerned about possible hernia anterior abdomen. He had a right total knee replacement. Since then has been doing exercises and stretches for his right lower extremity. He did strain his abdominal wall muscles. Over the past 4 days has been having pain midline anterior abdominal wall and in the groin areas. He does have a history of bilateral inguinal hernias that have been repaired. The pain comes on and off and is mainly related to activity. He also has suprapubic discomfort when sitting. No fever or chills. No nausea or vomiting or flank pain. Patient concerned about a hernia. I suspect this is due to muscle strain. Patient also concerned about appendicitis. Given he has no fever or chills or guarding I doubt appendicitis. Will look for hernia. I will send him for an abdominal ultrasound to evaluate for this. Discussed pain control. He is taking medication only as needed. He is on Tylenol and ibuprofen. Patient does need to have colon cancer screening. FIT kit was given. Patient has pain on and off right knee. Did have right total knee replacement. He has done physical therapy and is currently still doing range of motion exercises. We discussed pain control. He can take Tylenol arthritis for the pain. He will continue with the current management plan and he is being followed up by the orthopedist.  Knee still slightly swollen but no erythema or signs of infection.   I did reassure him that this will gradually go down. Past Medical History  Past Medical History:   Diagnosis Date    Arthritis     Chronic radicular cervical pain     DDD (degenerative disc disease), cervical     Fracture     Ganglion cyst of wrist     Hearing loss     Hemorrhoids     History of kidney stones     History of smokeless tobacco use     Hx of neck injury     Joint pain     Sleep apnea     pt. denies    TMJ (dislocation of temporomandibular joint)        Surgical History  Past Surgical History:   Procedure Laterality Date    HX HERNIA REPAIR  2013    HX ORTHOPAEDIC Left     leg repair    HX ORTHOPAEDIC Right 01/16/2019    Total knee replacement    HX SEPTOPLASTY  1990        Medications  Current Outpatient Medications   Medication Sig Dispense Refill    ibuprofen (MOTRIN) 200 mg tablet Take  by mouth.  acetaminophen (TYLENOL) 500 mg tablet Take 2 Tabs by mouth every six (6) hours as needed for Pain. 1000 Tab 0       Allergies  Allergies   Allergen Reactions    Amoxicillin Other (comments)     Itching rash    Sulfamethoxazole-Trimethoprim Other (comments)     Felt \"weird\"       Family History  Family History   Problem Relation Age of Onset    Cancer Other         paternal aunt    Diabetes Mother     Heart Disease Paternal Grandmother        Social History  Social History     Socioeconomic History    Marital status:      Spouse name: Not on file    Number of children: Not on file    Years of education: Not on file    Highest education level: Not on file   Social Needs    Financial resource strain: Not on file    Food insecurity - worry: Not on file    Food insecurity - inability: Not on file   Orgger needs - medical: Not on file   Orgger needs - non-medical: Not on file   Occupational History    Occupation: manager   Tobacco Use    Smoking status: Never Smoker    Smokeless tobacco: Never Used   Substance and Sexual Activity    Alcohol use:  No Alcohol/week: 0.0 oz     Comment: 1989.  Drug use: No    Sexual activity: Yes     Partners: Female   Other Topics Concern     Service Not Asked    Blood Transfusions Not Asked    Caffeine Concern No    Occupational Exposure No    Hobby Hazards No    Sleep Concern No    Stress Concern No    Weight Concern No    Special Diet Yes    Back Care Not Asked    Exercise Yes    Bike Helmet Not Asked    Seat Belt Yes    Self-Exams No   Social History Narrative    Not on file       Review of Systems  Review of Systems -review of all systems negative except as noted above in the HPI. Vital Signs  Visit Vitals  /64 (BP 1 Location: Left arm, BP Patient Position: Sitting)   Pulse 74   Temp 98 °F (36.7 °C) (Oral)   Resp 16   Ht 6' 2.5\" (1.892 m)   Wt 221 lb (100.2 kg)   SpO2 100%   BMI 28.00 kg/m²         Physical Exam  Physical Examination: General appearance - alert, well appearing, and in no distress, oriented to person, place, and time and acyanotic, in no respiratory distress  Mental status - alert, oriented to person, place, and time, affect appropriate to mood  Lymphatics - no palpable lymphadenopathy  Chest - clear to auscultation, no wheezes, rales or rhonchi, symmetric air entry  Heart - S1 and S2 normal  Abdomen -anterior abdominal wall discomfort midline below the umbilicus. This is generalized. No rebound and no guarding. No hernia noted. Back exam - limited range of motion  Neurological - alert, oriented, normal speech, no focal findings or movement disorder noted  Musculoskeletal -right knee status post TKA, slightly swollen as compared to the left. No erythema.   Patient has good range of motion  Extremities - no pedal edema noted    Results  Results for orders placed or performed during the hospital encounter of 01/16/19   GLUCOSE, POC   Result Value Ref Range    Glucose (POC) 93 65 - 105 mg/dL   GLUCOSE, POC   Result Value Ref Range    Glucose (POC) 159 (H) 65 - 105 mg/dL ASSESSMENT and PLAN    ICD-10-CM ICD-9-CM    1. Lower abdominal pain R10.30 789.09 US ABD COMP   2. Screen for colon cancer Z12.11 V76.51 OCCULT BLOOD IMMUNOASSAY,DIAGNOSTIC      AMB POC URINALYSIS DIP STICK AUTO W/O MICRO   3. Right knee pain, unspecified chronicity M25.561 719.46    4. S/P total knee arthroplasty, right Z96.651 V43.65    Did a urinalysis. His lower abdominal pain is myofascial.  This I suspect is more likely due to muscle strain. We discussed supportive care for this. lab results and schedule of future lab studies reviewed with patient  reviewed diet, exercise and weight control  reviewed medications and side effects in detail  radiology results and schedule of future radiology studies reviewed with patient    I have discussed the diagnosis with the patient and the intended plan of care as seen in the above orders. The patient has received an after-visit summary and questions were answered concerning future plans. I have discussed medication, side effects, and warnings with the patient in detail. The patient verbalized understanding and is in agreement with the plan of care. The patient will contact the office with any additional concerns.     Tahira Felipe MD

## 2019-03-08 ENCOUNTER — TELEPHONE (OUTPATIENT)
Dept: FAMILY MEDICINE CLINIC | Age: 59
End: 2019-03-08

## 2019-03-18 ENCOUNTER — TELEPHONE (OUTPATIENT)
Dept: FAMILY MEDICINE CLINIC | Age: 59
End: 2019-03-18

## 2019-03-18 NOTE — TELEPHONE ENCOUNTER
Patient called about his US results.  (done at [facility]) Urethritis: Care Instructions  Your Care Instructions    Urethritis is an infection of the tube that takes urine from the bladder to the outside of the body. This tube is called the urethra. The infection is often caused by bacteria. This can happen if you have a sexually transmitted infection (STI). But a virus may also be a cause. Urethritis is usually treated with antibiotics. Most cases clear up with treatment. Proper treatment is very important. If you don't treat it, the infection can lead to lasting damage of the urethra. Other parts of the urinary system can also be damaged. Follow-up care is a key part of your treatment and safety. Be sure to make and go to all appointments, and call your doctor if you are having problems. It's also a good idea to know your test results and keep a list of the medicines you take. How can you care for yourself at home? · If your doctor prescribed antibiotics, take them as directed. Do not stop taking them just because you feel better. You need to take the full course of antibiotics. · Take an over-the-counter pain medicine, such as acetaminophen (Tylenol), ibuprofen (Advil, Motrin), or naproxen (Aleve), if needed. Be safe with medicines. Read and follow all instructions on the label. · Do not take two or more pain medicines at the same time unless the doctor told you to. Many pain medicines have acetaminophen, which is Tylenol. Too much acetaminophen (Tylenol) can be harmful. · Your doctor may have you take phenazopyridine (Pyridium). This is a pain medicine for the urinary tract. It can turn your urine a deep red-orange. This is normal. Call your doctor if you think you are having a problem with your medicine. · Do not have sex until you are done with treatment. If you do have sex, be sure to use a condom. Your sex partner or partners should be tested too if your urethritis was caused by an STI.   · If your infection was caused by an injury or chemicals, avoid those things if you can. When should you call for help? Call your doctor now or seek immediate medical care if:  ? · You can't urinate. ? · You have symptoms of a urinary infection. For example:  ¨ You have blood or pus in your urine. ¨ You have pain in your back just below your rib cage. This is called flank pain. ¨ You have a fever, chills, or body aches. ¨ It hurts to urinate. ¨ You have groin or belly pain. ? · You have a hard time urinating when your bladder is full. ? · You notice mental changes or feel confused. ? Watch closely for changes in your health, and be sure to contact your doctor if:  ? · You do not get better as expected. Where can you learn more? Go to http://mouna-arnie.info/. Enter D360 in the search box to learn more about \"Urethritis: Care Instructions. \"  Current as of: May 12, 2017  Content Version: 11.4  © 6958-5018 [employer]. Care instructions adapted under license by [employer] (which disclaims liability or warranty for this information). If you have questions about a medical condition or this instruction, always ask your healthcare professional. [place] [address] any warranty or liability for your use of this information.

## 2019-03-18 NOTE — TELEPHONE ENCOUNTER
Results received via fax today. Please allow provider 48-72 hour to review and result.  Patient will be contact once reviewed by provider

## 2019-03-27 ENCOUNTER — PATIENT OUTREACH (OUTPATIENT)
Dept: FAMILY MEDICINE CLINIC | Age: 59
End: 2019-03-27

## 2019-03-27 NOTE — PROGRESS NOTES
NN health screening:    Noted pt was given fit kit during his OV this month. Will continue to follow and if no result from fit I will call again.

## 2019-03-28 DIAGNOSIS — R10.30 LOWER ABDOMINAL PAIN: ICD-10-CM

## 2019-05-08 ENCOUNTER — PATIENT OUTREACH (OUTPATIENT)
Dept: FAMILY MEDICINE CLINIC | Age: 59
End: 2019-05-08

## 2019-07-02 ENCOUNTER — TELEPHONE (OUTPATIENT)
Dept: FAMILY MEDICINE CLINIC | Age: 59
End: 2019-07-02

## 2019-07-02 DIAGNOSIS — Z12.11 COLON CANCER SCREENING: Primary | ICD-10-CM

## 2019-07-02 NOTE — TELEPHONE ENCOUNTER
Mr. Bernardino Bowman does not have the fit kit but agrees to swing by the office to pick it up. I've placed another order and he will swing by the office \"soon. \"

## 2019-09-04 ENCOUNTER — TELEPHONE (OUTPATIENT)
Dept: FAMILY MEDICINE CLINIC | Age: 59
End: 2019-09-04

## 2019-09-05 NOTE — TELEPHONE ENCOUNTER
Spoke with patient at this time. Patient was very upset that he didn't received a phone call back in a timely manner. I did apologize to patient at this time. Patient states he wasn't having any symptoms. He just thought his animals was infected and he had concerns. He took his animal to the vet and was tested. The results was negative at this time. Patient would like to speak with manager at this time due to phone call not being return in a timely manner.  Will forwarded message to manager at this time

## 2019-09-09 ENCOUNTER — TELEPHONE (OUTPATIENT)
Dept: FAMILY MEDICINE CLINIC | Age: 59
End: 2019-09-09

## 2019-09-09 NOTE — TELEPHONE ENCOUNTER
Called [facility] in regards to our failed to return his phone call within what he felt was a timely manner. Nurse had already apologized and addressed the issue. He explained he did not have rabies but felt we should have called him back sooner as it was an urgent problem. I again apologized and made sure everything was taken care of and he assured me it was. He suggested we train on taking better messages and I agreed with his suggestion on matter that require an urgent response.  Patient thanked me for calling

## 2019-11-05 ENCOUNTER — PATIENT OUTREACH (OUTPATIENT)
Dept: FAMILY MEDICINE CLINIC | Age: 59
End: 2019-11-05

## 2019-11-05 NOTE — PROGRESS NOTES
NN health screening:    Several attempts reminding patient to complete his fit kit without success. Closed episode.

## 2022-03-19 PROBLEM — M19.90 OSTEOARTHRITIS: Status: ACTIVE | Noted: 2019-01-16

## 2022-03-19 PROBLEM — Z96.651 S/P TKR (TOTAL KNEE REPLACEMENT) USING CEMENT, RIGHT: Status: ACTIVE | Noted: 2019-01-16

## 2023-05-21 RX ORDER — IBUPROFEN 200 MG
TABLET ORAL
COMMUNITY

## 2023-05-21 RX ORDER — ACETAMINOPHEN 500 MG
1000 TABLET ORAL EVERY 6 HOURS PRN
COMMUNITY
Start: 2019-01-17

## 2024-02-16 NOTE — MR AVS SNAPSHOT
Visit Information Date & Time Provider Department Dept. Phone Encounter #  
 10/26/2017  1:30 PM Trina Amato Sa, MD [facility] [phone] [id number] Follow-up Instructions Return in about 3 months (around 1/26/2018), or if symptoms worsen or fail to improve, for acute stress. Upcoming Health Maintenance Date Due DTaP/Tdap/Td series (1 - Tdap) 12/20/1983 FOBT Q 1 YEAR AGE 50-75 12/20/2012 Allergies as of 10/26/2017  Review Complete On: 10/26/2017 By: Maya Wilkinson MD  
  
 Severity Noted Reaction Type Reactions Amoxicillin Medium 01/30/2008    Other (comments) Sulfamethoxazole-trimethoprim  11/25/2013    Other (comments) Felt \"weird\" Current Immunizations  Never Reviewed No immunizations on file. Not reviewed this visit You Were Diagnosed With   
  
 Codes Comments Acute stress disorder    -  Primary ICD-10-CM: F43.0 ICD-9-CM: 308.3 Mood disorder ([facility] 75.)     ICD-10-CM: F39 
ICD-9-CM: 296.90 Screen for colon cancer     ICD-10-CM: Z12.11 ICD-9-CM: V76.51 Vitals BP Pulse Temp Resp Height(growth percentile) Weight(growth percentile) 115/77 (BP 1 Location: Left arm, BP Patient Position: Sitting) 94 96.5 °F (35.8 °C) (Oral) 16 6' 2\" (1.88 m) 227 lb (103 kg) SpO2 BMI Smoking Status 98% 29.15 kg/m2 Never Smoker BMI and BSA Data Body Mass Index Body Surface Area  
 29.15 kg/m 2 2.32 m 2 Preferred Pharmacy Pharmacy Name Phone [facility][id number] Meredith Pel[address] [place] [address] Your Updated Medication List  
  
   
This list is accurate as of: 10/26/17  2:07 PM.  Always use your most recent med list.  
  
  
  
  
 sertraline 25 mg tablet Commonly known as:  ZOLOFT  
TAKE 1 TAB BY MOUTH DAILY. Prescriptions Sent to Pharmacy Refills  
 sertraline (ZOLOFT) 25 mg tablet 1 Sig: TAKE 1 TAB BY MOUTH DAILY. Class: Normal  
 Pharmacy: [facility] [address] [facility], [address] [facility] #: [phone] We Performed the Following REFERRAL TO COLON AND RECTAL SURGERY [REF17 Custom] Comments:  
 Colon cancer screening Follow-up Instructions Return in about 3 months (around 1/26/2018), or if symptoms worsen or fail to improve, for acute stress. Referral Information Referral ID Referred By Referred To [id number], [address] [facility] Ismael jeff, [address] Phone: [phone] Fax: [phone] Visits Status Start Date End Date 1 New Request 10/26/17 10/26/18 If your referral has a status of pending review or denied, additional information will be sent to support the outcome of this decision. Patient Instructions Learning About Mood Disorders What are mood disorders? Mood disorders are medical problems that affect how you feel. They can impact your moods, thoughts, and actions. Mood disorders include: · Depression. This causes you to feel sad or hopeless for much of the time. · Bipolar disorder. This causes extreme mood changes from manic episodes of very high energy to extreme lows of depression. · Seasonal affective disorder (SAD). This is a type of depression that affects you during the same season each year. Most often people experience SAD during the fall and winter months when days are shorter and there is less light. What are the symptoms? Depression You may: · Feel sad or hopeless nearly every day. · Lose interest in or not get pleasure from most daily activities. You feel this way nearly every day. · Have low energy, changes in your appetite, or changes in how well you sleep. · Have trouble concentrating. · Think about death and suicide.  Keep the numbers for these national suicide hotlines: 1-501-948-TALK (0-339.137.8209) and 3-089-MHLTHDC (4-613.448.1129). If you or someone you know talks about suicide or feeling hopeless, get help right away. Bipolar disorder Symptoms depend on your mood swings. You may: · Feel very happy, energetic, or on edge. · Feel like you need very little sleep. · Feel overly self-confident. · Do impulsive things, such as spending a lot of money. · Feel sad or hopeless. · Have racing thoughts or trouble thinking and making decisions. · Lose interest in things you have enjoyed in the past. 
· Think about death and suicide. Keep the numbers for these national suicide hotlines: 0-716-600-TALK (0-407.438.2784) and 2-883-XLPQXQE (3-868.563.6157). If you or someone you know talks about suicide or feeling hopeless, get help right away. Seasonal affective disorder (SAD) Symptoms come and go at about the same time each year. For most people with SAD, symptoms come during the winter when there is less daylight. You may: · Feel sad, grumpy, woodard, or anxious. · Lose interest in your usual activities. · Eat more and crave carbohydrates, such as bread and pasta. · Gain weight. · Sleep more and feel drowsy during the daytime. How are mood disorders treated? Mood disorders can be treated with medicines or counseling, or a combination of both. Medicines for depression and SAD may include antidepressants. Medicines for bipolar disorder may include: · Mood stabilizers. · Antipsychotics. · Benzodiazepines. Counseling may involve cognitive-behavioral therapy. It teaches you how to change the ways you think and behave. This can help you stop thinking bad thoughts about yourself and your life. Light therapy is the main treatment for SAD. This therapy uses a special kind of lamp. You let the lamp shine on you at certain times, usually in the morning. This may help your symptoms during the months when there is less sunlight. Healthy lifestyle Healthy lifestyle changes may help you feel better. · Get at least 30 minutes of exercise on most days of the week. Walking is a good choice. · Eat a healthy diet. Include fruits, vegetables, lean proteins, and whole grains in your diet each day. · Keep a regular sleep schedule. Try for 8 hours of sleep a night. · Find ways to manage stress, such as relaxation exercises. · Avoid alcohol and illegal drugs. Follow-up care is a key part of your treatment and safety. Be sure to make and go to all appointments, and call your doctor if you are having problems. It's also a good idea to know your test results and keep a list of the medicines you take. Where can you learn more? Go to http://silvina-parth.info/. Enter X759 in the search box to learn more about \"Learning About Mood Disorders. \" Current as of: May 12, 2017 Content Version: 11.4 © 2201-2602 [employer]. Care instructions adapted under license by [employer] (which disclaims liability or warranty for this information). If you have questions about a medical condition or this instruction, always ask your healthcare professional. [address] any warranty or liability for your use of this information. Introducing [facility] & HEALTH [facility]! Dear Sherri Ruiz: Thank you for requesting a [employer] account. Our records indicate that you already have an active [employer] account. You can access your account anytime at [email or web address] Did you know that you can access your hospital and ER discharge instructions at any time in [employer])? You can also review all of your test results from your hospital stay or ER visit. Additional Information If you have questions, please visit the Frequently Asked Questions section of the [employer] website at [email or web address] Remember, [employer] is NOT to be used for urgent needs. For medical emergencies, dial 911. Now available from your iPhone and Android! Please provide this summary of care documentation to your next provider. Your primary care clinician is listed as Trina Gee. If you have any questions after today's visit, please call [phone]. 1-2 cups/cans per day